# Patient Record
Sex: MALE | Race: WHITE | NOT HISPANIC OR LATINO | Employment: OTHER | URBAN - NONMETROPOLITAN AREA
[De-identification: names, ages, dates, MRNs, and addresses within clinical notes are randomized per-mention and may not be internally consistent; named-entity substitution may affect disease eponyms.]

---

## 2021-08-03 ENCOUNTER — OFFICE VISIT (OUTPATIENT)
Dept: FAMILY MEDICINE | Facility: CLINIC | Age: 56
End: 2021-08-03
Payer: OTHER GOVERNMENT

## 2021-08-03 VITALS
SYSTOLIC BLOOD PRESSURE: 128 MMHG | RESPIRATION RATE: 16 BRPM | WEIGHT: 205.19 LBS | DIASTOLIC BLOOD PRESSURE: 89 MMHG | TEMPERATURE: 97 F | BODY MASS INDEX: 32.2 KG/M2 | HEIGHT: 67 IN | HEART RATE: 72 BPM | OXYGEN SATURATION: 97 %

## 2021-08-03 DIAGNOSIS — R81 GLYCOSURIA: ICD-10-CM

## 2021-08-03 DIAGNOSIS — N40.1 BENIGN PROSTATIC HYPERPLASIA WITH URINARY HESITANCY: ICD-10-CM

## 2021-08-03 DIAGNOSIS — R39.11 HESITANCY: Primary | ICD-10-CM

## 2021-08-03 DIAGNOSIS — R39.11 BENIGN PROSTATIC HYPERPLASIA WITH URINARY HESITANCY: ICD-10-CM

## 2021-08-03 PROBLEM — G47.20 SLEEP PATTERN DISTURBANCE: Status: ACTIVE | Noted: 2021-01-12

## 2021-08-03 PROBLEM — R41.840 POOR CONCENTRATION: Status: ACTIVE | Noted: 2020-01-24

## 2021-08-03 PROBLEM — M62.838 MUSCLE SPASMS OF HEAD OR NECK: Status: ACTIVE | Noted: 2021-01-12

## 2021-08-03 PROBLEM — G44.86 CERVICOGENIC HEADACHE: Status: ACTIVE | Noted: 2021-01-12

## 2021-08-03 PROBLEM — M54.2 NECK PAIN: Status: ACTIVE | Noted: 2021-01-12

## 2021-08-03 PROBLEM — Z12.11 SCREENING FOR COLON CANCER: Status: ACTIVE | Noted: 2021-02-08

## 2021-08-03 PROBLEM — Z87.828 H/O HEAD INJURY: Status: ACTIVE | Noted: 2020-01-24

## 2021-08-03 PROBLEM — R41.3 MEMORY PROBLEM: Status: ACTIVE | Noted: 2020-01-24

## 2021-08-03 PROBLEM — G43.719 INTRACTABLE CHRONIC MIGRAINE WITHOUT AURA AND WITHOUT STATUS MIGRAINOSUS: Status: ACTIVE | Noted: 2020-01-24

## 2021-08-03 PROBLEM — E66.9 OBESITY: Status: ACTIVE | Noted: 2021-01-12

## 2021-08-03 PROBLEM — D12.6 BENIGN NEOPLASM OF COLON: Status: ACTIVE | Noted: 2021-03-19

## 2021-08-03 PROBLEM — M54.81 CERVICO-OCCIPITAL NEURALGIA: Status: ACTIVE | Noted: 2021-01-12

## 2021-08-03 LAB
ANION GAP SERPL CALCULATED.3IONS-SCNC: 11 MMOL/L (ref 7–16)
BILIRUB SERPL-MCNC: NEGATIVE MG/DL
BLOOD URINE, POC: NEGATIVE
BUN SERPL-MCNC: 15 MG/DL (ref 7–18)
BUN/CREAT SERPL: 12 (ref 6–20)
CALCIUM SERPL-MCNC: 9.2 MG/DL (ref 8.5–10.1)
CHLORIDE SERPL-SCNC: 99 MMOL/L (ref 98–107)
CO2 SERPL-SCNC: 30 MMOL/L (ref 21–32)
COLOR, POC UA: NORMAL
CREAT SERPL-MCNC: 1.21 MG/DL (ref 0.7–1.3)
GLUCOSE SERPL-MCNC: 381 MG/DL (ref 74–106)
GLUCOSE UR QL STRIP: NORMAL
KETONES UR QL STRIP: NEGATIVE
LEUKOCYTE ESTERASE URINE, POC: NEGATIVE
NITRITE, POC UA: NEGATIVE
PH, POC UA: 5
POTASSIUM SERPL-SCNC: 4.2 MMOL/L (ref 3.5–5.1)
PROTEIN, POC: NEGATIVE
SODIUM SERPL-SCNC: 136 MMOL/L (ref 136–145)
SPECIFIC GRAVITY, POC UA: 1.01
UROBILINOGEN, POC UA: 0.2

## 2021-08-03 PROCEDURE — 80048 BASIC METABOLIC PNL TOTAL CA: CPT | Mod: ,,, | Performed by: CLINICAL MEDICAL LABORATORY

## 2021-08-03 PROCEDURE — 84153 PSA, TOTAL AND FREE: ICD-10-PCS | Mod: 90,,, | Performed by: CLINICAL MEDICAL LABORATORY

## 2021-08-03 PROCEDURE — 87086 CULTURE, URINE: ICD-10-PCS | Mod: ,,, | Performed by: CLINICAL MEDICAL LABORATORY

## 2021-08-03 PROCEDURE — 80048 BASIC METABOLIC PANEL: ICD-10-PCS | Mod: ,,, | Performed by: CLINICAL MEDICAL LABORATORY

## 2021-08-03 PROCEDURE — 99213 OFFICE O/P EST LOW 20 MIN: CPT | Mod: ,,, | Performed by: FAMILY MEDICINE

## 2021-08-03 PROCEDURE — 81003 POCT URINALYSIS W/O SCOPE: ICD-10-PCS | Mod: QW,,, | Performed by: FAMILY MEDICINE

## 2021-08-03 PROCEDURE — 84154 ASSAY OF PSA FREE: CPT | Mod: 90,,, | Performed by: CLINICAL MEDICAL LABORATORY

## 2021-08-03 PROCEDURE — 81003 URINALYSIS AUTO W/O SCOPE: CPT | Mod: QW,,, | Performed by: FAMILY MEDICINE

## 2021-08-03 PROCEDURE — 84154 PSA, TOTAL AND FREE: ICD-10-PCS | Mod: 90,,, | Performed by: CLINICAL MEDICAL LABORATORY

## 2021-08-03 PROCEDURE — 99213 PR OFFICE/OUTPT VISIT, EST, LEVL III, 20-29 MIN: ICD-10-PCS | Mod: ,,, | Performed by: FAMILY MEDICINE

## 2021-08-03 PROCEDURE — 84153 ASSAY OF PSA TOTAL: CPT | Mod: 90,,, | Performed by: CLINICAL MEDICAL LABORATORY

## 2021-08-03 PROCEDURE — 87086 URINE CULTURE/COLONY COUNT: CPT | Mod: ,,, | Performed by: CLINICAL MEDICAL LABORATORY

## 2021-08-03 RX ORDER — AMITRIPTYLINE HYDROCHLORIDE 25 MG/1
25 TABLET, FILM COATED ORAL NIGHTLY
COMMUNITY
End: 2023-03-23

## 2021-08-03 RX ORDER — BACLOFEN 10 MG/1
TABLET ORAL
COMMUNITY
End: 2023-03-23

## 2021-08-03 RX ORDER — MELATONIN 3 MG
1 CAPSULE ORAL NIGHTLY
COMMUNITY

## 2021-08-03 RX ORDER — BUTALBITAL, ACETAMINOPHEN AND CAFFEINE 300; 40; 50 MG/1; MG/1; MG/1
CAPSULE ORAL
COMMUNITY
End: 2023-03-23

## 2021-08-03 RX ORDER — ZOLMITRIPTAN 5 MG/1
5 TABLET, FILM COATED ORAL
COMMUNITY

## 2021-08-03 RX ORDER — TAMSULOSIN HYDROCHLORIDE 0.4 MG/1
0.4 CAPSULE ORAL DAILY
Qty: 90 CAPSULE | Refills: 3 | Status: SHIPPED | OUTPATIENT
Start: 2021-08-03 | End: 2021-10-21 | Stop reason: SDUPTHER

## 2021-08-03 RX ORDER — CLONAZEPAM 1 MG/1
TABLET ORAL
COMMUNITY
End: 2022-09-07

## 2021-08-03 RX ORDER — AMITRIPTYLINE HYDROCHLORIDE 100 MG/1
TABLET ORAL
COMMUNITY

## 2021-08-03 RX ORDER — CYCLOBENZAPRINE HCL 10 MG
TABLET ORAL
COMMUNITY
End: 2023-03-23

## 2021-08-03 RX ORDER — INDOMETHACIN 75 MG/1
75 CAPSULE, EXTENDED RELEASE ORAL 2 TIMES DAILY
COMMUNITY
End: 2023-03-23

## 2021-08-03 RX ORDER — FREMANEZUMAB-VFRM 225 MG/1.5ML
1.5 INJECTION SUBCUTANEOUS
COMMUNITY
End: 2023-03-23

## 2021-08-03 RX ORDER — PROPRANOLOL HYDROCHLORIDE 80 MG/1
80 CAPSULE, EXTENDED RELEASE ORAL DAILY
COMMUNITY
End: 2023-03-23

## 2021-08-03 RX ORDER — ERGOCALCIFEROL 1.25 MG/1
CAPSULE ORAL
COMMUNITY
End: 2023-03-23

## 2021-08-04 DIAGNOSIS — Z79.4 TYPE 2 DIABETES MELLITUS WITH HYPERGLYCEMIA, WITH LONG-TERM CURRENT USE OF INSULIN: Primary | ICD-10-CM

## 2021-08-04 DIAGNOSIS — E11.65 TYPE 2 DIABETES MELLITUS WITH HYPERGLYCEMIA, WITH LONG-TERM CURRENT USE OF INSULIN: Primary | ICD-10-CM

## 2021-08-04 PROCEDURE — 83036 HEMOGLOBIN GLYCOSYLATED A1C: CPT | Mod: ,,, | Performed by: CLINICAL MEDICAL LABORATORY

## 2021-08-04 PROCEDURE — 36415 COLL VENOUS BLD VENIPUNCTURE: CPT | Mod: ,,, | Performed by: CLINICAL MEDICAL LABORATORY

## 2021-08-04 PROCEDURE — 36415 PR COLLECTION VENOUS BLOOD,VENIPUNCTURE: ICD-10-PCS | Mod: ,,, | Performed by: CLINICAL MEDICAL LABORATORY

## 2021-08-04 PROCEDURE — 83036 HEMOGLOBIN A1C: ICD-10-PCS | Mod: ,,, | Performed by: CLINICAL MEDICAL LABORATORY

## 2021-08-04 RX ORDER — METFORMIN HYDROCHLORIDE 1000 MG/1
1000 TABLET ORAL 2 TIMES DAILY WITH MEALS
Qty: 180 TABLET | Refills: 3 | Status: SHIPPED | OUTPATIENT
Start: 2021-08-04 | End: 2021-10-21 | Stop reason: SDUPTHER

## 2021-08-05 LAB
HBA1C MFR BLD: 16.4 % (ref 4–5.6)
PSA FREE MFR SERPL: NORMAL RATIO
PSA FREE SERPL IA-MCNC: 0.2 NG/ML
PSA SERPL IA-MCNC: 0.74 NG/ML
UA COMPLETE W REFLEX CULTURE PNL UR: NO GROWTH

## 2021-10-21 DIAGNOSIS — N40.1 BENIGN PROSTATIC HYPERPLASIA WITH URINARY HESITANCY: ICD-10-CM

## 2021-10-21 DIAGNOSIS — R39.11 BENIGN PROSTATIC HYPERPLASIA WITH URINARY HESITANCY: ICD-10-CM

## 2021-10-21 DIAGNOSIS — Z79.4 TYPE 2 DIABETES MELLITUS WITH HYPERGLYCEMIA, WITH LONG-TERM CURRENT USE OF INSULIN: ICD-10-CM

## 2021-10-21 DIAGNOSIS — E11.65 TYPE 2 DIABETES MELLITUS WITH HYPERGLYCEMIA, WITH LONG-TERM CURRENT USE OF INSULIN: ICD-10-CM

## 2021-10-21 RX ORDER — METFORMIN HYDROCHLORIDE 1000 MG/1
1000 TABLET ORAL 2 TIMES DAILY WITH MEALS
Qty: 180 TABLET | Refills: 3 | Status: SHIPPED | OUTPATIENT
Start: 2021-10-21 | End: 2022-09-07 | Stop reason: SDUPTHER

## 2021-10-21 RX ORDER — TAMSULOSIN HYDROCHLORIDE 0.4 MG/1
0.4 CAPSULE ORAL DAILY
Qty: 90 CAPSULE | Refills: 3 | Status: SHIPPED | OUTPATIENT
Start: 2021-10-21 | End: 2022-09-07 | Stop reason: SDUPTHER

## 2021-11-15 ENCOUNTER — OFFICE VISIT (OUTPATIENT)
Dept: FAMILY MEDICINE | Facility: CLINIC | Age: 56
End: 2021-11-15
Payer: OTHER GOVERNMENT

## 2021-11-15 VITALS
DIASTOLIC BLOOD PRESSURE: 98 MMHG | HEIGHT: 67 IN | OXYGEN SATURATION: 97 % | HEART RATE: 98 BPM | TEMPERATURE: 98 F | BODY MASS INDEX: 34.79 KG/M2 | SYSTOLIC BLOOD PRESSURE: 118 MMHG | WEIGHT: 221.63 LBS

## 2021-11-15 DIAGNOSIS — Z79.4 TYPE 2 DIABETES MELLITUS WITH HYPERGLYCEMIA, WITH LONG-TERM CURRENT USE OF INSULIN: Primary | ICD-10-CM

## 2021-11-15 DIAGNOSIS — E11.65 TYPE 2 DIABETES MELLITUS WITH HYPERGLYCEMIA, WITH LONG-TERM CURRENT USE OF INSULIN: Primary | ICD-10-CM

## 2021-11-15 PROBLEM — M25.529 ARTHRALGIA OF ELBOW: Status: ACTIVE | Noted: 2021-11-15

## 2021-11-15 PROBLEM — M54.50 LOW BACK PAIN: Status: ACTIVE | Noted: 2021-11-15

## 2021-11-15 PROBLEM — R51.9 FACIAL PAIN: Status: ACTIVE | Noted: 2021-01-12

## 2021-11-15 PROBLEM — S62.639B OPEN FRACTURE OF DISTAL PHALANX OR PHALANGES OF HAND: Status: ACTIVE | Noted: 2021-11-15

## 2021-11-15 LAB
ALBUMIN SERPL BCP-MCNC: 3.9 G/DL (ref 3.5–5)
ALBUMIN/GLOB SERPL: 1.1 {RATIO}
ALP SERPL-CCNC: 74 U/L (ref 45–115)
ALT SERPL W P-5'-P-CCNC: 51 U/L (ref 16–61)
ANION GAP SERPL CALCULATED.3IONS-SCNC: 9 MMOL/L (ref 7–16)
AST SERPL W P-5'-P-CCNC: 24 U/L (ref 15–37)
BILIRUB SERPL-MCNC: 0.3 MG/DL (ref 0–1.2)
BUN SERPL-MCNC: 12 MG/DL (ref 7–18)
BUN/CREAT SERPL: 11 (ref 6–20)
CALCIUM SERPL-MCNC: 9.3 MG/DL (ref 8.5–10.1)
CHLORIDE SERPL-SCNC: 104 MMOL/L (ref 98–107)
CHOLEST SERPL-MCNC: 189 MG/DL (ref 0–200)
CHOLEST/HDLC SERPL: 2.7 {RATIO}
CO2 SERPL-SCNC: 31 MMOL/L (ref 21–32)
CREAT SERPL-MCNC: 1.08 MG/DL (ref 0.7–1.3)
CREAT UR-MCNC: 100 MG/DL (ref 39–259)
EST. AVERAGE GLUCOSE BLD GHB EST-MCNC: 190 MG/DL
GLOBULIN SER-MCNC: 3.4 G/DL (ref 2–4)
GLUCOSE SERPL-MCNC: 100 MG/DL (ref 70–110)
GLUCOSE SERPL-MCNC: 103 MG/DL (ref 74–106)
HBA1C MFR BLD HPLC: 8.3 % (ref 4.5–6.6)
HDLC SERPL-MCNC: 69 MG/DL (ref 40–60)
LDLC SERPL CALC-MCNC: 103 MG/DL
LDLC/HDLC SERPL: 1.5 {RATIO}
MICROALBUMIN UR-MCNC: <0.5 MG/DL (ref 0–2.8)
MICROALBUMIN/CREAT RATIO PNL UR: <5 MG/G (ref 0–30)
NONHDLC SERPL-MCNC: 120 MG/DL
POTASSIUM SERPL-SCNC: 4.4 MMOL/L (ref 3.5–5.1)
PROT SERPL-MCNC: 7.3 G/DL (ref 6.4–8.2)
SODIUM SERPL-SCNC: 140 MMOL/L (ref 136–145)
TRIGL SERPL-MCNC: 83 MG/DL (ref 35–150)
VLDLC SERPL-MCNC: 17 MG/DL

## 2021-11-15 PROCEDURE — 80053 COMPREHENSIVE METABOLIC PANEL: ICD-10-PCS | Mod: ,,, | Performed by: CLINICAL MEDICAL LABORATORY

## 2021-11-15 PROCEDURE — 82043 UR ALBUMIN QUANTITATIVE: CPT | Mod: ,,, | Performed by: CLINICAL MEDICAL LABORATORY

## 2021-11-15 PROCEDURE — 82043 MICROALBUMIN / CREATININE RATIO URINE: ICD-10-PCS | Mod: ,,, | Performed by: CLINICAL MEDICAL LABORATORY

## 2021-11-15 PROCEDURE — 83036 HEMOGLOBIN A1C: ICD-10-PCS | Mod: ,,, | Performed by: CLINICAL MEDICAL LABORATORY

## 2021-11-15 PROCEDURE — 99213 OFFICE O/P EST LOW 20 MIN: CPT | Mod: ,,, | Performed by: FAMILY MEDICINE

## 2021-11-15 PROCEDURE — 82570 ASSAY OF URINE CREATININE: CPT | Mod: ,,, | Performed by: CLINICAL MEDICAL LABORATORY

## 2021-11-15 PROCEDURE — 80053 COMPREHEN METABOLIC PANEL: CPT | Mod: ,,, | Performed by: CLINICAL MEDICAL LABORATORY

## 2021-11-15 PROCEDURE — 99213 PR OFFICE/OUTPT VISIT, EST, LEVL III, 20-29 MIN: ICD-10-PCS | Mod: ,,, | Performed by: FAMILY MEDICINE

## 2021-11-15 PROCEDURE — 80061 LIPID PANEL: CPT | Mod: ,,, | Performed by: CLINICAL MEDICAL LABORATORY

## 2021-11-15 PROCEDURE — 83036 HEMOGLOBIN GLYCOSYLATED A1C: CPT | Mod: ,,, | Performed by: CLINICAL MEDICAL LABORATORY

## 2021-11-15 PROCEDURE — 82570 MICROALBUMIN / CREATININE RATIO URINE: ICD-10-PCS | Mod: ,,, | Performed by: CLINICAL MEDICAL LABORATORY

## 2021-11-15 PROCEDURE — 80061 LIPID PANEL: ICD-10-PCS | Mod: ,,, | Performed by: CLINICAL MEDICAL LABORATORY

## 2021-11-16 RX ORDER — ORAL SEMAGLUTIDE 3 MG/1
3 TABLET ORAL DAILY
Qty: 90 TABLET | Refills: 1 | Status: SHIPPED | OUTPATIENT
Start: 2021-11-16 | End: 2021-11-19 | Stop reason: SDUPTHER

## 2021-11-22 RX ORDER — ORAL SEMAGLUTIDE 3 MG/1
3 TABLET ORAL DAILY
Qty: 90 TABLET | Refills: 1 | Status: SHIPPED | OUTPATIENT
Start: 2021-11-22 | End: 2022-05-21

## 2022-06-17 LAB — HBA1C MFR BLD: 7.5 %

## 2022-09-07 ENCOUNTER — OFFICE VISIT (OUTPATIENT)
Dept: FAMILY MEDICINE | Facility: CLINIC | Age: 57
End: 2022-09-07
Payer: OTHER GOVERNMENT

## 2022-09-07 VITALS
RESPIRATION RATE: 16 BRPM | DIASTOLIC BLOOD PRESSURE: 84 MMHG | HEART RATE: 88 BPM | HEIGHT: 67 IN | SYSTOLIC BLOOD PRESSURE: 168 MMHG | OXYGEN SATURATION: 96 % | BODY MASS INDEX: 34.69 KG/M2 | WEIGHT: 221 LBS

## 2022-09-07 DIAGNOSIS — M54.81 CERVICO-OCCIPITAL NEURALGIA: Primary | ICD-10-CM

## 2022-09-07 DIAGNOSIS — Z79.4 TYPE 2 DIABETES MELLITUS WITH HYPERGLYCEMIA, WITH LONG-TERM CURRENT USE OF INSULIN: ICD-10-CM

## 2022-09-07 DIAGNOSIS — R39.11 BENIGN PROSTATIC HYPERPLASIA WITH URINARY HESITANCY: ICD-10-CM

## 2022-09-07 DIAGNOSIS — E11.65 TYPE 2 DIABETES MELLITUS WITH HYPERGLYCEMIA, WITH LONG-TERM CURRENT USE OF INSULIN: ICD-10-CM

## 2022-09-07 DIAGNOSIS — N40.1 BENIGN PROSTATIC HYPERPLASIA WITH URINARY HESITANCY: ICD-10-CM

## 2022-09-07 PROCEDURE — 99214 OFFICE O/P EST MOD 30 MIN: CPT | Mod: 25,,, | Performed by: FAMILY MEDICINE

## 2022-09-07 PROCEDURE — 20552 NJX 1/MLT TRIGGER POINT 1/2: CPT | Mod: ,,, | Performed by: FAMILY MEDICINE

## 2022-09-07 PROCEDURE — 20552 PR INJECT TRIGGER POINT, 1 OR 2: ICD-10-PCS | Mod: ,,, | Performed by: FAMILY MEDICINE

## 2022-09-07 PROCEDURE — 99214 PR OFFICE/OUTPT VISIT, EST, LEVL IV, 30-39 MIN: ICD-10-PCS | Mod: 25,,, | Performed by: FAMILY MEDICINE

## 2022-09-07 RX ORDER — TAMSULOSIN HYDROCHLORIDE 0.4 MG/1
0.4 CAPSULE ORAL DAILY
Qty: 90 CAPSULE | Refills: 1 | Status: SHIPPED | OUTPATIENT
Start: 2022-09-07 | End: 2023-03-23 | Stop reason: SDUPTHER

## 2022-09-07 RX ORDER — CLONAZEPAM 0.5 MG/1
1 TABLET ORAL 2 TIMES DAILY
COMMUNITY
Start: 2022-08-23

## 2022-09-07 RX ORDER — METFORMIN HYDROCHLORIDE 1000 MG/1
500 TABLET ORAL 2 TIMES DAILY WITH MEALS
Qty: 180 TABLET | Refills: 1 | Status: SHIPPED | OUTPATIENT
Start: 2022-09-07 | End: 2023-03-23 | Stop reason: SDUPTHER

## 2022-09-07 RX ORDER — METFORMIN HYDROCHLORIDE 1000 MG/1
1000 TABLET ORAL 2 TIMES DAILY WITH MEALS
Qty: 180 TABLET | Refills: 1 | Status: SHIPPED | OUTPATIENT
Start: 2022-09-07 | End: 2022-09-07

## 2022-09-07 RX ORDER — AMOXICILLIN 250 MG
CAPSULE ORAL
COMMUNITY
Start: 2022-02-20 | End: 2022-09-29

## 2022-09-07 NOTE — PROGRESS NOTES
Sravanthi Silvestre MD        PATIENT NAME: Eliazar Bermudez  : 1965  DATE: 22  MRN: [id number]      Billing Provider: Sravanthi Silvestre MD  Level of Service:   Patient PCP Information       Provider PCP Type    Sravanthi Silvestre MD General            Reason for Visit / Chief Complaint: Diabetes (3 mo f/u) and Headache (Chronic migraine and states he has one now.. He is seeing neurology but his doctor moved. )       History of Present Illness      Eliazar Bermudez presents to the clinic with Diabetes (3 mo f/u) and Headache (Chronic migraine and states he has one now.. He is seeing neurology but his doctor moved. )     Diabetes  He presents for his follow-up diabetic visit. He has type 2 diabetes mellitus. Hypoglycemia symptoms include confusion and headaches. Pertinent negatives for diabetes include no chest pain, no fatigue, no polydipsia, no polyuria and no weakness.   Headache   This is a recurrent problem. The current episode started more than 1 month ago. The problem occurs daily. The problem has been unchanged. The pain is located in the Bilateral and occipital region. The pain is at a severity of 8/10. Associated symptoms include neck pain. Pertinent negatives include no facial sweating, fever, hearing loss, loss of balance, rhinorrhea, vomiting or weakness.     Review of Systems     Review of Systems   Constitutional:  Positive for activity change and unexpected weight change. Negative for appetite change, fatigue and fever.   HENT:  Positive for trouble swallowing. Negative for hearing loss and rhinorrhea.    Eyes:  Positive for visual disturbance. Negative for discharge.   Respiratory:  Negative for chest tightness, shortness of breath and wheezing.    Cardiovascular:  Negative for chest pain and palpitations.   Gastrointestinal:  Positive for constipation. Negative for blood in stool, diarrhea and vomiting.   Endocrine: Negative for polydipsia and polyuria.   Genitourinary:  Negative for difficulty urinating,  hematuria and urgency.   Musculoskeletal:  Positive for arthralgias, joint swelling and neck pain.   Allergic/Immunologic: Positive for environmental allergies.   Neurological:  Positive for headaches. Negative for weakness and loss of balance.   Psychiatric/Behavioral:  Positive for confusion and dysphoric mood. Negative for agitation, behavioral problems and suicidal ideas.      Medical / Social / Family History     Past Medical History:   Diagnosis Date    Chronic migraine without aura     History of closed head injury 08/08/2018    Joint pain     Knee pain, right     Migraines        Past Surgical History:   Procedure Laterality Date    KNEE ARTHROSCOPY      x 3 right    KNEE SURGERY      x 4 left    TOTAL KNEE ARTHROPLASTY      left    WISDOM TOOTH EXTRACTION         Social History    reports that he has never smoked. He has never used smokeless tobacco. He reports current alcohol use. He reports that he does not use drugs.    Family History  's family history includes Arthritis in his maternal grandmother; COPD in his brother and mother; Diabetes in his maternal grandmother; Heart attack in his maternal grandfather.    Medications and Allergies     Medications  Outpatient Medications Marked as Taking for the 9/7/22 encounter (Office Visit) with Sravanthi Silvestre MD   Medication Sig Dispense Refill    amitriptyline (ELAVIL) 100 MG tablet amitriptyline 100 mg tablet      clonazePAM (KLONOPIN) 0.5 MG tablet Take 1 mg by mouth 2 (two) times daily.      melatonin 3 mg Cap melatonin   3 mg po q HS      senna-docusate 8.6-50 mg (PERICOLACE) 8.6-50 mg per tablet TAKE 1 TABLET BY MOUTH EVERY DAY AS NEEDED FOR CONSTIPATION      ZOLMitriptan (ZOMIG) 5 MG tablet Take 5 mg by mouth as needed for Migraine.      [DISCONTINUED] metFORMIN (GLUCOPHAGE) 1000 MG tablet Take 1 tablet (1,000 mg total) by mouth 2 (two) times daily with meals. 180 tablet 3    [DISCONTINUED] tamsulosin (FLOMAX) 0.4 mg Cap Take 1 capsule (0.4 mg  "total) by mouth once daily. 90 capsule 3       Allergies  Review of patient's allergies indicates:  No Known Allergies    Physical Examination   BP (!) 168/84 (BP Location: Right arm)   Pulse 88   Resp 16   Ht 5' 7" (1.702 m)   Wt 100.2 kg (221 lb)   SpO2 96%   BMI 34.61 kg/m²     Physical Exam  Constitutional:       Appearance: Normal appearance. He is obese.   Neck:        Comments: Triggers of high intensity pain as per above is where trigger joint injections were done today  Cardiovascular:      Rate and Rhythm: Normal rate and regular rhythm.   Pulmonary:      Effort: Pulmonary effort is normal.      Breath sounds: Normal breath sounds.   Musculoskeletal:      Cervical back: Pain with movement present.   Neurological:      Mental Status: He is alert.   Psychiatric:         Mood and Affect: Mood normal.         Behavior: Behavior normal.     The areas above were prepped with alcohol, using a 25 G needled I introduced about 0.5 a cc in each space of lidocaine with out epinephrine. The pt tolerated this well. Total points injected 8.    Assessment and Plan (including Health Maintenance)     Plan:         Problem List Items Addressed This Visit          Orthopedic    Cervico-occipital neuralgia - Primary     Other Visit Diagnoses       Benign prostatic hyperplasia with urinary hesitancy        Relevant Medications    tamsulosin (FLOMAX) 0.4 mg Cap    Type 2 diabetes mellitus with hyperglycemia, with long-term current use of insulin        Relevant Medications    metFORMIN (GLUCOPHAGE) 1000 MG tablet              Follow up in about 6 months (around 3/7/2023).        Signature:  Sravanthi Silvestre MD      Date of encounter: 9/7/22      "

## 2023-03-23 ENCOUNTER — OFFICE VISIT (OUTPATIENT)
Dept: FAMILY MEDICINE | Facility: CLINIC | Age: 58
End: 2023-03-23
Payer: OTHER GOVERNMENT

## 2023-03-23 VITALS
SYSTOLIC BLOOD PRESSURE: 140 MMHG | TEMPERATURE: 98 F | DIASTOLIC BLOOD PRESSURE: 88 MMHG | HEART RATE: 84 BPM | HEIGHT: 67 IN | OXYGEN SATURATION: 97 % | WEIGHT: 218 LBS | BODY MASS INDEX: 34.21 KG/M2 | RESPIRATION RATE: 18 BRPM

## 2023-03-23 DIAGNOSIS — Z12.5 SCREENING FOR MALIGNANT NEOPLASM OF PROSTATE: ICD-10-CM

## 2023-03-23 DIAGNOSIS — N40.1 BENIGN PROSTATIC HYPERPLASIA WITH URINARY HESITANCY: ICD-10-CM

## 2023-03-23 DIAGNOSIS — M25.561 CHRONIC PAIN OF RIGHT KNEE: ICD-10-CM

## 2023-03-23 DIAGNOSIS — Z79.4 TYPE 2 DIABETES MELLITUS WITH HYPERGLYCEMIA, WITH LONG-TERM CURRENT USE OF INSULIN: Primary | ICD-10-CM

## 2023-03-23 DIAGNOSIS — R39.11 BENIGN PROSTATIC HYPERPLASIA WITH URINARY HESITANCY: ICD-10-CM

## 2023-03-23 DIAGNOSIS — G89.29 CHRONIC PAIN OF RIGHT KNEE: ICD-10-CM

## 2023-03-23 DIAGNOSIS — E78.5 HYPERLIPIDEMIA, UNSPECIFIED HYPERLIPIDEMIA TYPE: ICD-10-CM

## 2023-03-23 DIAGNOSIS — E11.65 TYPE 2 DIABETES MELLITUS WITH HYPERGLYCEMIA, WITH LONG-TERM CURRENT USE OF INSULIN: Primary | ICD-10-CM

## 2023-03-23 DIAGNOSIS — I10 HYPERTENSION, UNSPECIFIED TYPE: ICD-10-CM

## 2023-03-23 LAB
ALBUMIN SERPL BCP-MCNC: 3.8 G/DL (ref 3.5–5)
ALBUMIN/GLOB SERPL: 1.2 {RATIO}
ALP SERPL-CCNC: 80 U/L (ref 45–115)
ALT SERPL W P-5'-P-CCNC: 94 U/L (ref 16–61)
ANION GAP SERPL CALCULATED.3IONS-SCNC: 9 MMOL/L (ref 7–16)
AST SERPL W P-5'-P-CCNC: 36 U/L (ref 15–37)
BASOPHILS # BLD AUTO: 0.04 K/UL (ref 0–0.2)
BASOPHILS NFR BLD AUTO: 0.7 % (ref 0–1)
BILIRUB SERPL-MCNC: 0.3 MG/DL (ref ?–1.2)
BUN SERPL-MCNC: 12 MG/DL (ref 7–18)
BUN/CREAT SERPL: 10 (ref 6–20)
CALCIUM SERPL-MCNC: 9 MG/DL (ref 8.5–10.1)
CHLORIDE SERPL-SCNC: 100 MMOL/L (ref 98–107)
CO2 SERPL-SCNC: 29 MMOL/L (ref 21–32)
CREAT SERPL-MCNC: 1.24 MG/DL (ref 0.7–1.3)
DIFFERENTIAL METHOD BLD: ABNORMAL
EGFR (NO RACE VARIABLE) (RUSH/TITUS): 67 ML/MIN/1.73M²
EOSINOPHIL # BLD AUTO: 0.2 K/UL (ref 0–0.5)
EOSINOPHIL NFR BLD AUTO: 3.4 % (ref 1–4)
ERYTHROCYTE [DISTWIDTH] IN BLOOD BY AUTOMATED COUNT: 12.1 % (ref 11.5–14.5)
EST. AVERAGE GLUCOSE BLD GHB EST-MCNC: 250 MG/DL
GLOBULIN SER-MCNC: 3.3 G/DL (ref 2–4)
GLUCOSE SERPL-MCNC: 235 MG/DL (ref 74–106)
HBA1C MFR BLD HPLC: 10.1 % (ref 4.5–6.6)
HCT VFR BLD AUTO: 40.3 % (ref 40–54)
HGB BLD-MCNC: 12.9 G/DL (ref 13.5–18)
IMM GRANULOCYTES # BLD AUTO: 0.03 K/UL (ref 0–0.04)
IMM GRANULOCYTES NFR BLD: 0.5 % (ref 0–0.4)
LDLC SERPL DIRECT ASSAY-MCNC: 121 MG/DL
LYMPHOCYTES # BLD AUTO: 2.34 K/UL (ref 1–4.8)
LYMPHOCYTES NFR BLD AUTO: 39.9 % (ref 27–41)
MCH RBC QN AUTO: 28.4 PG (ref 27–31)
MCHC RBC AUTO-ENTMCNC: 32 G/DL (ref 32–36)
MCV RBC AUTO: 88.8 FL (ref 80–96)
MONOCYTES # BLD AUTO: 0.56 K/UL (ref 0–0.8)
MONOCYTES NFR BLD AUTO: 9.6 % (ref 2–6)
MPC BLD CALC-MCNC: 11 FL (ref 9.4–12.4)
NEUTROPHILS # BLD AUTO: 2.69 K/UL (ref 1.8–7.7)
NEUTROPHILS NFR BLD AUTO: 45.9 % (ref 53–65)
NRBC # BLD AUTO: 0 X10E3/UL
NRBC, AUTO (.00): 0 %
PLATELET # BLD AUTO: 194 K/UL (ref 150–400)
POTASSIUM SERPL-SCNC: 3.9 MMOL/L (ref 3.5–5.1)
PROT SERPL-MCNC: 7.1 G/DL (ref 6.4–8.2)
PSA SERPL-MCNC: 1.25 NG/ML
RBC # BLD AUTO: 4.54 M/UL (ref 4.6–6.2)
SODIUM SERPL-SCNC: 134 MMOL/L (ref 136–145)
WBC # BLD AUTO: 5.86 K/UL (ref 4.5–11)

## 2023-03-23 PROCEDURE — G0103 PSA SCREENING: HCPCS | Mod: ,,, | Performed by: CLINICAL MEDICAL LABORATORY

## 2023-03-23 PROCEDURE — 83036 HEMOGLOBIN A1C: ICD-10-PCS | Mod: ,,, | Performed by: CLINICAL MEDICAL LABORATORY

## 2023-03-23 PROCEDURE — 80053 COMPREHENSIVE METABOLIC PANEL: ICD-10-PCS | Mod: ,,, | Performed by: CLINICAL MEDICAL LABORATORY

## 2023-03-23 PROCEDURE — 83721 LDL CHOLESTEROL, DIRECT: ICD-10-PCS | Mod: ,,, | Performed by: CLINICAL MEDICAL LABORATORY

## 2023-03-23 PROCEDURE — 85025 CBC WITH DIFFERENTIAL: ICD-10-PCS | Mod: ,,, | Performed by: CLINICAL MEDICAL LABORATORY

## 2023-03-23 PROCEDURE — 85025 COMPLETE CBC W/AUTO DIFF WBC: CPT | Mod: ,,, | Performed by: CLINICAL MEDICAL LABORATORY

## 2023-03-23 PROCEDURE — G0103 PSA, SCREENING: ICD-10-PCS | Mod: ,,, | Performed by: CLINICAL MEDICAL LABORATORY

## 2023-03-23 PROCEDURE — 83036 HEMOGLOBIN GLYCOSYLATED A1C: CPT | Mod: ,,, | Performed by: CLINICAL MEDICAL LABORATORY

## 2023-03-23 PROCEDURE — 99214 PR OFFICE/OUTPT VISIT, EST, LEVL IV, 30-39 MIN: ICD-10-PCS | Mod: ,,, | Performed by: NURSE PRACTITIONER

## 2023-03-23 PROCEDURE — 99214 OFFICE O/P EST MOD 30 MIN: CPT | Mod: ,,, | Performed by: NURSE PRACTITIONER

## 2023-03-23 PROCEDURE — 80053 COMPREHEN METABOLIC PANEL: CPT | Mod: ,,, | Performed by: CLINICAL MEDICAL LABORATORY

## 2023-03-23 PROCEDURE — 83721 ASSAY OF BLOOD LIPOPROTEIN: CPT | Mod: ,,, | Performed by: CLINICAL MEDICAL LABORATORY

## 2023-03-23 RX ORDER — CITALOPRAM 40 MG/1
1 TABLET, FILM COATED ORAL DAILY
COMMUNITY
End: 2023-03-23

## 2023-03-23 RX ORDER — PRAVASTATIN SODIUM 20 MG/1
1 TABLET ORAL DAILY
COMMUNITY
End: 2023-03-23

## 2023-03-23 RX ORDER — CELECOXIB 200 MG/1
1 CAPSULE ORAL DAILY
COMMUNITY
End: 2023-03-23

## 2023-03-23 RX ORDER — LISINOPRIL 10 MG/1
1 TABLET ORAL DAILY
COMMUNITY
End: 2023-03-23

## 2023-03-23 RX ORDER — OMEGA-3S/DHA/EPA/FISH OIL/D3 300MG-1000
1 CAPSULE ORAL DAILY
COMMUNITY
Start: 2022-12-10

## 2023-03-23 RX ORDER — TOPIRAMATE 50 MG/1
50 TABLET, FILM COATED ORAL DAILY
COMMUNITY
Start: 2023-01-31 | End: 2023-09-06

## 2023-03-23 RX ORDER — TAMSULOSIN HYDROCHLORIDE 0.4 MG/1
0.4 CAPSULE ORAL DAILY
Qty: 90 CAPSULE | Refills: 1 | Status: SHIPPED | OUTPATIENT
Start: 2023-03-23 | End: 2023-09-14 | Stop reason: SDUPTHER

## 2023-03-23 RX ORDER — SILDENAFIL 100 MG/1
1 TABLET, FILM COATED ORAL
COMMUNITY
Start: 2022-12-06 | End: 2023-03-23

## 2023-03-23 RX ORDER — NORTRIPTYLINE HYDROCHLORIDE 25 MG/1
3 CAPSULE ORAL NIGHTLY
COMMUNITY
End: 2023-03-23

## 2023-03-23 RX ORDER — METFORMIN HYDROCHLORIDE 1000 MG/1
500 TABLET ORAL 2 TIMES DAILY WITH MEALS
Qty: 180 TABLET | Refills: 1 | Status: SHIPPED | OUTPATIENT
Start: 2023-03-23 | End: 2023-09-06 | Stop reason: SDUPTHER

## 2023-03-23 RX ORDER — DIVALPROEX SODIUM 250 MG/1
1 TABLET, DELAYED RELEASE ORAL 2 TIMES DAILY
COMMUNITY
End: 2023-03-23

## 2023-03-23 NOTE — ASSESSMENT & PLAN NOTE
Not to goal, - work on diet, specifically cutting back bread, breaded things, cereal, pasta, potatoes, rice  - try to exercise at least 150min a week divided however you want  - if you can do weight, even very light weight do them  - try not to use to much salt, if any, remember that things that are preserved or frozen often contain large amounts of salt as a preserve

## 2023-03-23 NOTE — ASSESSMENT & PLAN NOTE
Lab today - work on diet, specifically cutting back bread, breaded things, cereal, pasta, potatoes, rice  - try to exercise at least 150min a week divided however you want  - if you can do weight, even very light weight do them  - keep an eye on sugars, fasting sugar goal , after eating no greater than 180  - A1C goal is around 7.0%  - continue current regiment and please note if any changes were made

## 2023-03-23 NOTE — PROGRESS NOTES
LAWRENCE Parks   [facility]  [address]  [place] [address]     PATIENT NAME: Eliazar Bermudez  : 1965  DATE: 3/23/23  MRN: [id number]      Billing Provider: LAWRENCE Parks  Level of Service:   Patient PCP Information       Provider PCP Type    LAWRENCE Parks General            Reason for Visit / Chief Complaint: Establish Care, Diabetes, and Benign Prostatic Hypertrophy       Update PCP  Update Chief Complaint         History of Present Illness / Problem Focused Workflow     Eliazar Bermudez presents to the clinic establish care for diabetes on metformin and bph on flomax he reports medications are working well.     Hemoglobin A1C   Date Value Ref Range Status   2022 7.5 (H) % Final   11/15/2021 8.3 (H) 4.5 - 6.6 % Final     Comment:       Normal:               <5.7%  Pre-Diabetic:       5.7% to 6.4%  Diabetic:             >6.4%  Diabetic Goal:     <7%        CMP  Sodium   Date Value Ref Range Status   11/15/2021 140 136 - 145 mmol/L Final     Potassium   Date Value Ref Range Status   11/15/2021 4.4 3.5 - 5.1 mmol/L Final     Chloride   Date Value Ref Range Status   11/15/2021 104 98 - 107 mmol/L Final     CO2   Date Value Ref Range Status   11/15/2021 31 21 - 32 mmol/L Final     Glucose   Date Value Ref Range Status   11/15/2021 103 74 - 106 mg/dL Final     BUN   Date Value Ref Range Status   11/15/2021 12 7 - 18 mg/dL Final     Creatinine   Date Value Ref Range Status   11/15/2021 1.08 0.70 - 1.30 mg/dL Final     Calcium   Date Value Ref Range Status   11/15/2021 9.3 8.5 - 10.1 mg/dL Final     Total Protein   Date Value Ref Range Status   11/15/2021 7.3 6.4 - 8.2 g/dL Final     Albumin   Date Value Ref Range Status   11/15/2021 3.9 3.5 - 5.0 g/dL Final     Bilirubin, Total   Date Value Ref Range Status   11/15/2021 0.3 0.0 - 1.2 mg/dL Final     Alk Phos   Date Value Ref Range Status   11/15/2021 74 45 - 115 U/L Final     AST   Date Value Ref Range  Status   11/15/2021 24 15 - 37 U/L Final     ALT   Date Value Ref Range Status   11/15/2021 51 16 - 61 U/L Final     Anion Gap   Date Value Ref Range Status   11/15/2021 9 7 - 16 mmol/L Final        No results found for: WBC, RBC, HGB, HCT, MCV, MCH, MCHC, RDW, PLT, MPV, GRAN, LYMPH, MONO, EOS, BASO, EOSINOPHIL, BASOPHIL     Lab Results   Component Value Date    CHOL 189 11/15/2021     Lab Results   Component Value Date    HDL 69 (H) 11/15/2021     Lab Results   Component Value Date    LDLCALC 103 11/15/2021     Lab Results   Component Value Date    TRIG 83 11/15/2021     Lab Results   Component Value Date    CHOLHDL 2.7 11/15/2021        Wt Readings from Last 3 Encounters:   03/23/23 0855 98.9 kg (218 lb)   09/07/22 1558 100.2 kg (221 lb)   11/15/21 1011 100.5 kg (221 lb 9.6 oz)        BP Readings from Last 3 Encounters:   03/23/23 (!) 140/88   09/07/22 (!) 168/84   11/15/21 (!) 118/98        Review of Systems     Review of Systems   Constitutional:  Negative for appetite change and fatigue.   HENT:  Negative for ear pain, mouth sores and trouble swallowing.    Respiratory:  Negative for cough and shortness of breath.    Cardiovascular:  Negative for chest pain.   Gastrointestinal:  Negative for abdominal pain.   Musculoskeletal:  Positive for arthralgias and leg pain.        Right knee pain      Medical / Social / Family History     Past Medical History:   Diagnosis Date    Chronic migraine without aura     History of closed head injury 08/08/2018    Joint pain     Knee pain, right     Migraines        Past Surgical History:   Procedure Laterality Date    FRACTURE SURGERY  05/25/2009    Knee scope    JOINT REPLACEMENT  09/22/2012    KNEE ARTHROSCOPY      x 3 right    KNEE SURGERY      x 4 left    TOTAL KNEE ARTHROPLASTY      left    WISDOM TOOTH EXTRACTION         Social History    reports that he has never smoked. He has never been exposed to tobacco smoke. He has never used smokeless tobacco. He reports  "current alcohol use. He reports that he does not use drugs.    Family History  's family history includes Arthritis in his maternal grandmother and mother; COPD in his brother and mother; Diabetes in his maternal grandmother; Heart attack in his maternal grandfather.    Medications and Allergies     Medications  Outpatient Medications Marked as Taking for the 3/23/23 encounter (Office Visit) with LAWRENCE Parks   Medication Sig Dispense Refill    amitriptyline (ELAVIL) 100 MG tablet amitriptyline 100 mg tablet      clonazePAM (KLONOPIN) 0.5 MG tablet Take 1 mg by mouth 2 (two) times daily.      melatonin 3 mg Cap Take 1 capsule by mouth every evening.      topiramate (TOPAMAX) 50 MG tablet Take 50 mg by mouth Daily.      VITAMIN D3 50 mcg (2,000 unit) tablet Take 1 tablet by mouth Daily.      ZOLMitriptan (ZOMIG) 5 MG tablet Take 5 mg by mouth as needed for Migraine.      [DISCONTINUED] metFORMIN (GLUCOPHAGE) 1000 MG tablet Take 0.5 tablets (500 mg total) by mouth 2 (two) times daily with meals. 180 tablet 1    [DISCONTINUED] tamsulosin (FLOMAX) 0.4 mg Cap Take 1 capsule (0.4 mg total) by mouth once daily. 90 capsule 1       Allergies  Review of patient's allergies indicates:  No Known Allergies    Physical Examination     Vitals:    03/23/23 0855 03/23/23 0951   BP: (!) 144/86 (!) 140/88   BP Location: Left arm Left arm   Patient Position: Sitting Sitting   Pulse: 84    Resp: 18    Temp: 97.9 °F (36.6 °C)    TempSrc: Oral    SpO2: 97%    Weight: 98.9 kg (218 lb)    Height: 5' 7" (1.702 m)       Physical Exam  Vitals and nursing note reviewed.   Constitutional:       General: He is not in acute distress.     Appearance: Normal appearance. He is not ill-appearing or toxic-appearing.   HENT:      Right Ear: External ear normal.      Left Ear: External ear normal.      Nose: Nose normal.      Mouth/Throat:      Mouth: Mucous membranes are moist.      Pharynx: Oropharynx is clear.   Eyes:      " Conjunctiva/sclera: Conjunctivae normal.      Pupils: Pupils are equal, round, and reactive to light.   Cardiovascular:      Rate and Rhythm: Normal rate and regular rhythm.      Heart sounds: Normal heart sounds.   Pulmonary:      Breath sounds: Normal breath sounds.   Musculoskeletal:      Cervical back: Normal range of motion and neck supple.      Right knee: Swelling and crepitus present.      Left knee: Decreased range of motion.   Skin:     General: Skin is warm.      Capillary Refill: Capillary refill takes less than 2 seconds.   Neurological:      Mental Status: He is alert.        Assessment and Plan (including Health Maintenance)      Problem List  Smart Sets  Document Outside HM   :    Plan:     There are no Patient Instructions on file for this visit.     Health Maintenance Due   Topic Date Due    Eye Exam  Never done    Low Dose Statin  Never done    Lipid Panel  11/15/2022    Hemoglobin A1c  12/17/2022       Problem List Items Addressed This Visit          Cardiac/Vascular    Hypertension    Current Assessment & Plan     Not to goal, - work on diet, specifically cutting back bread, breaded things, cereal, pasta, potatoes, rice  - try to exercise at least 150min a week divided however you want  - if you can do weight, even very light weight do them  - try not to use to much salt, if any, remember that things that are preserved or frozen often contain large amounts of salt as a preserve         Relevant Orders    Comprehensive Metabolic Panel    CBC Auto Differential    Hyperlipidemia    Current Assessment & Plan     Labs today will continue to monitor follow lflc diet and increase physical activity            Renal/    Benign prostatic hyperplasia with urinary hesitancy    Current Assessment & Plan     flomax  The current medical regimen is effective;  continue present plan and medications.          Relevant Medications    tamsulosin (FLOMAX) 0.4 mg Cap       Endocrine    Type 2 diabetes mellitus with  hyperglycemia, with long-term current use of insulin - Primary    Current Assessment & Plan     Lab today - work on diet, specifically cutting back bread, breaded things, cereal, pasta, potatoes, rice  - try to exercise at least 150min a week divided however you want  - if you can do weight, even very light weight do them  - keep an eye on sugars, fasting sugar goal , after eating no greater than 180  - A1C goal is around 7.0%  - continue current regiment and please note if any changes were made         Relevant Medications    metFORMIN (GLUCOPHAGE) 1000 MG tablet    Other Relevant Orders    Hemoglobin A1C    LDL Cholesterol, Direct       Orthopedic    Chronic pain of right knee    Current Assessment & Plan     Trial of voltaren if needing ortho referral notify when ready          Other Visit Diagnoses       Screening for malignant neoplasm of prostate        Relevant Orders    PSA, Screening          Type 2 diabetes mellitus with hyperglycemia, with long-term current use of insulin  -     Hemoglobin A1C; Future; Expected date: 03/23/2023  -     metFORMIN (GLUCOPHAGE) 1000 MG tablet; Take 0.5 tablets (500 mg total) by mouth 2 (two) times daily with meals.  Dispense: 180 tablet; Refill: 1  -     LDL Cholesterol, Direct; Future; Expected date: 03/23/2023    Hypertension, unspecified type  -     Comprehensive Metabolic Panel; Future; Expected date: 03/23/2023  -     CBC Auto Differential; Future; Expected date: 03/23/2023    Screening for malignant neoplasm of prostate  -     PSA, Screening; Future; Expected date: 03/23/2023    Hyperlipidemia, unspecified hyperlipidemia type    Benign prostatic hyperplasia with urinary hesitancy  -     tamsulosin (FLOMAX) 0.4 mg Cap; Take 1 capsule (0.4 mg total) by mouth once daily.  Dispense: 90 capsule; Refill: 1    Chronic pain of right knee       Health Maintenance Topics with due status: Not Due       Topic Last Completion Date    Diabetes Urine Screening 06/17/2022    Foot  Exam 03/23/2023       Procedures     Future Appointments   Date Time Provider Department Center   9/19/2023  8:15 AM LAWRENCE Parks [facility]        No follow-ups on file.     Signature:  LAWRENCE Parks    Date of encounter: 3/23/23

## 2023-03-23 NOTE — PROGRESS NOTES
Protective Sensation (w/ 10 gram monofilament):  Right: Intact  Left: Intact    Visual Inspection:  Normal -  Bilateral and Dry Skin -  Bilateral    Pedal Pulses:   Right: Present  Left: Present    Posterior Tibialis Pulses:   Right:Present  Left: Present

## 2023-03-24 DIAGNOSIS — Z79.4 TYPE 2 DIABETES MELLITUS WITH HYPERGLYCEMIA, WITH LONG-TERM CURRENT USE OF INSULIN: ICD-10-CM

## 2023-03-24 DIAGNOSIS — E78.5 HYPERLIPIDEMIA, UNSPECIFIED HYPERLIPIDEMIA TYPE: Primary | ICD-10-CM

## 2023-03-24 DIAGNOSIS — E11.65 TYPE 2 DIABETES MELLITUS WITH HYPERGLYCEMIA, WITH LONG-TERM CURRENT USE OF INSULIN: ICD-10-CM

## 2023-03-24 RX ORDER — LANCETS
1 EACH MISCELLANEOUS 2 TIMES DAILY
Qty: 100 EACH | Refills: 11 | Status: SHIPPED | OUTPATIENT
Start: 2023-03-24

## 2023-03-24 RX ORDER — INSULIN PUMP SYRINGE, 3 ML
EACH MISCELLANEOUS
Qty: 1 EACH | Refills: 0 | Status: SHIPPED | OUTPATIENT
Start: 2023-03-24 | End: 2024-03-23

## 2023-03-24 RX ORDER — PRAVASTATIN SODIUM 10 MG/1
10 TABLET ORAL DAILY
Qty: 90 TABLET | Refills: 0 | Status: SHIPPED | OUTPATIENT
Start: 2023-03-24 | End: 2023-06-22

## 2023-03-24 NOTE — PROGRESS NOTES
Diabetes not controlled change metformin to 1000 mg twice daily start checking glucose at least daily- does he want to meet with Ms Marie for diabetic education?  PSA ok  LDL elevated start pravastatin 10 mg daily, follow up 3 months and get fasting labs

## 2023-06-22 DIAGNOSIS — E78.5 HYPERLIPIDEMIA, UNSPECIFIED HYPERLIPIDEMIA TYPE: ICD-10-CM

## 2023-06-22 RX ORDER — PRAVASTATIN SODIUM 10 MG/1
TABLET ORAL
Qty: 90 TABLET | Refills: 0 | Status: SHIPPED | OUTPATIENT
Start: 2023-06-22 | End: 2023-09-09

## 2023-09-06 ENCOUNTER — OFFICE VISIT (OUTPATIENT)
Dept: FAMILY MEDICINE | Facility: CLINIC | Age: 58
End: 2023-09-06
Payer: OTHER GOVERNMENT

## 2023-09-06 VITALS
RESPIRATION RATE: 18 BRPM | HEART RATE: 94 BPM | DIASTOLIC BLOOD PRESSURE: 84 MMHG | WEIGHT: 205.81 LBS | HEIGHT: 67 IN | TEMPERATURE: 97 F | OXYGEN SATURATION: 98 % | BODY MASS INDEX: 32.3 KG/M2 | SYSTOLIC BLOOD PRESSURE: 130 MMHG

## 2023-09-06 DIAGNOSIS — R39.11 BENIGN PROSTATIC HYPERPLASIA WITH URINARY HESITANCY: ICD-10-CM

## 2023-09-06 DIAGNOSIS — I10 PRIMARY HYPERTENSION: ICD-10-CM

## 2023-09-06 DIAGNOSIS — E11.65 TYPE 2 DIABETES MELLITUS WITH HYPERGLYCEMIA, WITHOUT LONG-TERM CURRENT USE OF INSULIN: Primary | ICD-10-CM

## 2023-09-06 DIAGNOSIS — N40.1 BENIGN PROSTATIC HYPERPLASIA WITH URINARY HESITANCY: ICD-10-CM

## 2023-09-06 DIAGNOSIS — E78.2 MIXED HYPERLIPIDEMIA: ICD-10-CM

## 2023-09-06 PROBLEM — S62.639B OPEN FRACTURE OF DISTAL PHALANX OF FINGER: Status: RESOLVED | Noted: 2021-11-15 | Resolved: 2023-09-06

## 2023-09-06 LAB
ALBUMIN SERPL BCP-MCNC: 3.6 G/DL (ref 3.5–5)
ALBUMIN/GLOB SERPL: 1 {RATIO}
ALP SERPL-CCNC: 110 U/L (ref 45–115)
ALT SERPL W P-5'-P-CCNC: 27 U/L (ref 16–61)
ANION GAP SERPL CALCULATED.3IONS-SCNC: 14 MMOL/L (ref 7–16)
AST SERPL W P-5'-P-CCNC: 15 U/L (ref 15–37)
BILIRUB SERPL-MCNC: 0.4 MG/DL (ref ?–1.2)
BUN SERPL-MCNC: 13 MG/DL (ref 7–18)
BUN/CREAT SERPL: 10 (ref 6–20)
CALCIUM SERPL-MCNC: 8.7 MG/DL (ref 8.5–10.1)
CHLORIDE SERPL-SCNC: 102 MMOL/L (ref 98–107)
CHOLEST SERPL-MCNC: 228 MG/DL (ref 0–200)
CHOLEST/HDLC SERPL: 3.7 {RATIO}
CO2 SERPL-SCNC: 25 MMOL/L (ref 21–32)
CREAT SERPL-MCNC: 1.3 MG/DL (ref 0.7–1.3)
CREAT UR-MCNC: 155 MG/DL (ref 39–259)
EGFR (NO RACE VARIABLE) (RUSH/TITUS): 63 ML/MIN/1.73M2
EST. AVERAGE GLUCOSE BLD GHB EST-MCNC: 327 MG/DL
GLOBULIN SER-MCNC: 3.7 G/DL (ref 2–4)
GLUCOSE SERPL-MCNC: 295 MG/DL (ref 70–110)
GLUCOSE SERPL-MCNC: 349 MG/DL (ref 74–106)
HBA1C MFR BLD HPLC: 12.4 % (ref 4.5–6.6)
HDLC SERPL-MCNC: 61 MG/DL (ref 40–60)
LDLC SERPL CALC-MCNC: 137 MG/DL
LDLC/HDLC SERPL: 2.2 {RATIO}
MICROALBUMIN UR-MCNC: 0.6 MG/DL (ref 0–2.8)
MICROALBUMIN/CREAT RATIO PNL UR: 3.9 MG/G (ref 0–30)
NONHDLC SERPL-MCNC: 167 MG/DL
POTASSIUM SERPL-SCNC: 3.7 MMOL/L (ref 3.5–5.1)
PROT SERPL-MCNC: 7.3 G/DL (ref 6.4–8.2)
SODIUM SERPL-SCNC: 137 MMOL/L (ref 136–145)
TRIGL SERPL-MCNC: 152 MG/DL (ref 35–150)
VLDLC SERPL-MCNC: 30 MG/DL

## 2023-09-06 PROCEDURE — 82570 ASSAY OF URINE CREATININE: CPT | Mod: ,,, | Performed by: CLINICAL MEDICAL LABORATORY

## 2023-09-06 PROCEDURE — 82043 MICROALBUMIN / CREATININE RATIO URINE: ICD-10-PCS | Mod: ,,, | Performed by: CLINICAL MEDICAL LABORATORY

## 2023-09-06 PROCEDURE — 80053 COMPREHEN METABOLIC PANEL: CPT | Mod: ,,, | Performed by: CLINICAL MEDICAL LABORATORY

## 2023-09-06 PROCEDURE — 82043 UR ALBUMIN QUANTITATIVE: CPT | Mod: ,,, | Performed by: CLINICAL MEDICAL LABORATORY

## 2023-09-06 PROCEDURE — 83036 HEMOGLOBIN GLYCOSYLATED A1C: CPT | Mod: ,,, | Performed by: CLINICAL MEDICAL LABORATORY

## 2023-09-06 PROCEDURE — 80061 LIPID PANEL: ICD-10-PCS | Mod: ,,, | Performed by: CLINICAL MEDICAL LABORATORY

## 2023-09-06 PROCEDURE — 83036 HEMOGLOBIN A1C: ICD-10-PCS | Mod: ,,, | Performed by: CLINICAL MEDICAL LABORATORY

## 2023-09-06 PROCEDURE — 82570 MICROALBUMIN / CREATININE RATIO URINE: ICD-10-PCS | Mod: ,,, | Performed by: CLINICAL MEDICAL LABORATORY

## 2023-09-06 PROCEDURE — 99214 PR OFFICE/OUTPT VISIT, EST, LEVL IV, 30-39 MIN: ICD-10-PCS | Mod: ,,, | Performed by: STUDENT IN AN ORGANIZED HEALTH CARE EDUCATION/TRAINING PROGRAM

## 2023-09-06 PROCEDURE — 80061 LIPID PANEL: CPT | Mod: ,,, | Performed by: CLINICAL MEDICAL LABORATORY

## 2023-09-06 PROCEDURE — 99214 OFFICE O/P EST MOD 30 MIN: CPT | Mod: ,,, | Performed by: STUDENT IN AN ORGANIZED HEALTH CARE EDUCATION/TRAINING PROGRAM

## 2023-09-06 PROCEDURE — 80053 COMPREHENSIVE METABOLIC PANEL: ICD-10-PCS | Mod: ,,, | Performed by: CLINICAL MEDICAL LABORATORY

## 2023-09-06 RX ORDER — ATOGEPANT 60 MG/1
1 TABLET ORAL
COMMUNITY
Start: 2023-08-28

## 2023-09-06 RX ORDER — METFORMIN HYDROCHLORIDE 1000 MG/1
1000 TABLET ORAL 2 TIMES DAILY WITH MEALS
Qty: 180 TABLET | Refills: 1 | Status: SHIPPED | OUTPATIENT
Start: 2023-09-06 | End: 2023-09-27

## 2023-09-06 NOTE — PROGRESS NOTES
Progress Note     VICKIE ALEJANDRE MD   [address]  [place] [address]     PATIENT NAME: Eliazar Bermudez  : 1965  DATE: 23  MRN: [id number]      Billing Provider: VICKIE ALEJANDRE MD  Level of Service: AL OFFICE/OUTPT VISIT, EST, LEVL IV, 30-39 MIN  Patient PCP Information       Provider PCP Type    [facility]                Establish Care and Health Maintenance (Pt is due for diabetic eye exam/Pt requested to be referred to [facility]/Pt is due for a diabetes urine screening)      SUBJECTIVE:     Eliazar Bermudez is a 58 y.o.male who presents to clinic for Establish Care and Health Maintenance (Pt is due for diabetic eye exam/Pt requested to be referred to [facility]/Pt is due for a diabetes urine screening)    Patient presents to clinic today for establishment of care.  Patient has a history of type 2 diabetes.  Patient is not currently taking anything for his type 2 diabetes.  Patient's last hemoglobin A1c in March was greater than 10.  Patient has never considered himself a diabetic.  Patient notes he did take metformin for time but was told he was taking that to help prevent development of diabetes.  Patient does not monitor his blood glucose levels at home.  Patient does have some intermittent polyuria.    Patient also has a history of hyperlipidemia.  Patient was prescribed pravastatin in the past.  Patient is not currently taking this medication.    Patient also has a history of migraines for which he is followed by Dr. Silver in [place].  Patient takes Quilipta and Zomig.     Patient also has a history of BPH for which he takes Flomax.  He notes it typically does work well.      Patient also has a history of anxiety and mood disorder for which he takes Klonopin and Elavil.  [facility]  Patient also has a history of vitamin-D deficiency and is [facility].  They monitor  his levels twice yearly.  He takes melatonin for sleep and notes it does work well.  He denies any adverse side effects.      Past Medical History:  has a past medical history of Chronic migraine without aura, History of closed head injury (08/08/2018), Joint pain, Knee pain, right, and Migraines.   Past Surgical History:  has a past surgical history that includes Total knee arthroplasty; Knee surgery; Knee arthroscopy; [place] tooth extraction; Fracture surgery (05/25/2009); and Joint replacement (09/22/2012).  Family History: family history includes Arthritis in his maternal grandmother and mother; COPD in his brother and mother; Diabetes in his maternal grandmother; Heart attack in his maternal grandfather.  Social History:  reports that he has never smoked. He has never been exposed to tobacco smoke. He has never used smokeless tobacco. He reports current alcohol use. He reports that he does not use drugs.  Allergies: Review of patient's allergies indicates:  No Known Allergies      Current Outpatient Medications:     amitriptyline (ELAVIL) 100 MG tablet, amitriptyline 100 mg tablet, Disp: , Rfl:     clonazePAM (KLONOPIN) 0.5 MG tablet, Take 1 mg by mouth 2 (two) times daily., Disp: , Rfl:     melatonin 3 mg Cap, Take 1 capsule by mouth every evening., Disp: , Rfl:     QULIPTA 60 mg Tab, Take 1 tablet by mouth., Disp: , Rfl:     tamsulosin (FLOMAX) 0.4 mg Cap, Take 1 capsule (0.4 mg total) by mouth once daily., Disp: 90 capsule, Rfl: 1    VITAMIN D3 50 mcg (2,000 unit) tablet, Take 1 tablet by mouth Daily., Disp: , Rfl:     ZOLMitriptan (ZOMIG) 5 MG tablet, Take 5 mg by mouth as needed for Migraine., Disp: , Rfl:     blood sugar diagnostic Strp, 1 strip by Misc.(Non-Drug; Combo Route) route 2 (two) times a day. (Patient not taking: Reported on 9/6/2023), Disp: 70 each, Rfl: 11    blood-glucose meter kit, Use as instructed (Patient not taking: Reported on 9/6/2023), Disp: 1 each, Rfl: 0    lancets (LANCETS,THIN)  "Misc, 1 each by Misc.(Non-Drug; Combo Route) route 2 (two) times a day. (Patient not taking: Reported on 9/6/2023), Disp: 100 each, Rfl: 11    metFORMIN (GLUCOPHAGE) 1000 MG tablet, Take 1 tablet (1,000 mg total) by mouth 2 (two) times daily with meals., Disp: 180 tablet, Rfl: 1    pravastatin (PRAVACHOL) 10 MG tablet, TAKE 1 TABLET BY MOUTH EVERY DAY (Patient not taking: Reported on 9/6/2023), Disp: 90 tablet, Rfl: 0   OBJECTIVE:     Vital Signs   /84 (BP Location: Left arm, Patient Position: Sitting, BP Method: Large (Manual))   Pulse 94   Temp 97.4 °F (36.3 °C) (Temporal)   Resp 18   Ht 5' 7" (1.702 m)   Wt 93.4 kg (205 lb 12.8 oz)   SpO2 98%   BMI 32.23 kg/m²     Physical Exam  Constitutional:       General: He is not in acute distress.     Appearance: Normal appearance. He is not ill-appearing.   HENT:      Head: Normocephalic and atraumatic.   Eyes:      Extraocular Movements: Extraocular movements intact.      Pupils: Pupils are equal, round, and reactive to light.   Cardiovascular:      Rate and Rhythm: Normal rate and regular rhythm.      Heart sounds: No murmur heard.     No friction rub. No gallop.   Pulmonary:      Effort: Pulmonary effort is normal. No respiratory distress.      Breath sounds: Normal breath sounds. No wheezing, rhonchi or rales.   Abdominal:      Palpations: Abdomen is soft.      Tenderness: There is no abdominal tenderness. There is no guarding or rebound.   Musculoskeletal:         General: Normal range of motion.   Skin:     General: Skin is warm and dry.      Capillary Refill: Capillary refill takes less than 2 seconds.   Neurological:      General: No focal deficit present.      Mental Status: He is alert.   Psychiatric:         Mood and Affect: Mood normal.         Behavior: Behavior normal.         ASSESSMENT/PLAN:     1. Type 2 diabetes mellitus with hyperglycemia, without long-term current use of insulin  -     Comprehensive Metabolic Panel; Future; Expected date: " 09/06/2023  -     Hemoglobin A1C; Future; Expected date: 09/06/2023  -     Microalbumin/Creatinine Ratio, Urine  -     Ambulatory referral/consult to Ophthalmology; Future; Expected date: 09/13/2023  -     metFORMIN (GLUCOPHAGE) 1000 MG tablet; Take 1 tablet (1,000 mg total) by mouth 2 (two) times daily with meals.  Dispense: 180 tablet; Refill: 1  -     Lipid Panel; Future; Expected date: 09/06/2023  -     POCT glucose  Patient has a history of type 2 diabetes.  Patient is not currently on any medication.  We will obtain hemoglobin A1c and microalbumin/creatinine ratio.  Patient is up-to-date on his foot exam.  We will refer for eye exam.  We will restart metformin.  We will also obtain lipid panel and CMP particularly given elevated liver enzymes on most recent CMP.  Patient will likely need additional medication after starting metformin.  Discussed possibly starting Jardiance after we receive his hemoglobin A1c.  Patient would be willing to start this medication.  Patient also advised to start checking his blood glucose levels twice daily.  Patient verbalized understanding.    2. Mixed hyperlipidemia  -     Lipid Panel; Future; Expected date: 09/06/2023  Patient has a history of mixed hyperlipidemia.  Patient has a lipid panel from 2021 which was unremarkable.  Patient did have an LDL performed earlier this year and it was within normal limits.  We will obtain complete lipid panel particularly given his diabetes.  Patient will likely benefit from statin therapy.  We will discuss when we call patient with results.    3. Primary hypertension  -     Comprehensive Metabolic Panel; Future; Expected date: 09/06/2023  Patient does have history of hypertension listed as a diagnosis.  Patient has had hypertension in the past but his blood pressure is currently well-controlled.  Patient is not currently on any medication for hypertension.  We will continue to monitor.  Pending results of urine microalbumin creatinine  ratio, patient will possibly need to be started on lisinopril given his diabetes.  His blood pressure will likely tolerate this without difficulty.    4.BPH  Patient with normal PSA earlier this year.  Patient to continue Flomax.    Follow up in about 3 months (around 12/6/2023) for Recheck diabetes..      VICKIE ALEJANDRE MD  09/06/2023

## 2023-09-09 RX ORDER — ATORVASTATIN CALCIUM 40 MG/1
40 TABLET, FILM COATED ORAL DAILY
Qty: 90 TABLET | Refills: 3 | Status: SHIPPED | OUTPATIENT
Start: 2023-09-09 | End: 2023-09-27

## 2023-09-14 DIAGNOSIS — R39.11 BENIGN PROSTATIC HYPERPLASIA WITH URINARY HESITANCY: ICD-10-CM

## 2023-09-14 DIAGNOSIS — N40.1 BENIGN PROSTATIC HYPERPLASIA WITH URINARY HESITANCY: ICD-10-CM

## 2023-09-14 RX ORDER — TAMSULOSIN HYDROCHLORIDE 0.4 MG/1
0.4 CAPSULE ORAL DAILY
Qty: 90 CAPSULE | Refills: 1 | Status: SHIPPED | OUTPATIENT
Start: 2023-09-14 | End: 2024-01-30 | Stop reason: SDUPTHER

## 2023-09-27 DIAGNOSIS — E11.65 TYPE 2 DIABETES MELLITUS WITH HYPERGLYCEMIA, WITHOUT LONG-TERM CURRENT USE OF INSULIN: ICD-10-CM

## 2023-09-27 RX ORDER — ATORVASTATIN CALCIUM 40 MG/1
40 TABLET, FILM COATED ORAL NIGHTLY
Qty: 90 TABLET | Refills: 1 | Status: SHIPPED | OUTPATIENT
Start: 2023-09-27

## 2023-09-27 RX ORDER — METFORMIN HYDROCHLORIDE 1000 MG/1
1000 TABLET ORAL 2 TIMES DAILY WITH MEALS
Qty: 180 TABLET | Refills: 1 | Status: SHIPPED | OUTPATIENT
Start: 2023-09-27

## 2023-11-14 ENCOUNTER — PATIENT MESSAGE (OUTPATIENT)
Dept: ADMINISTRATIVE | Facility: HOSPITAL | Age: 58
End: 2023-11-14

## 2024-01-17 DIAGNOSIS — Z12.11 SCREENING FOR COLON CANCER: ICD-10-CM

## 2024-01-29 PROBLEM — G47.33 OBSTRUCTIVE SLEEP APNEA SYNDROME: Status: ACTIVE | Noted: 2024-01-29

## 2024-01-29 PROBLEM — G43.909 MIGRAINE HEADACHE: Status: ACTIVE | Noted: 2024-01-29

## 2024-01-29 PROBLEM — G89.4 CHRONIC PAIN SYNDROME: Status: ACTIVE | Noted: 2024-01-29

## 2024-01-29 PROBLEM — H93.19 TINNITUS: Status: ACTIVE | Noted: 2024-01-29

## 2024-01-29 PROBLEM — F51.5 NIGHTMARE DISORDER: Status: ACTIVE | Noted: 2024-01-29

## 2024-01-29 PROBLEM — N52.9 ERECTILE DYSFUNCTION: Status: ACTIVE | Noted: 2024-01-29

## 2024-01-29 PROBLEM — F06.30 MOOD DISORDER DUE TO A GENERAL MEDICAL CONDITION: Status: ACTIVE | Noted: 2024-01-29

## 2024-01-29 PROBLEM — E55.9 VITAMIN D DEFICIENCY: Status: ACTIVE | Noted: 2024-01-29

## 2024-01-29 NOTE — PROGRESS NOTES
Progress Note     VICKIE ALEJANDRE MD   [address]  [place] [address]     PATIENT NAME: Eliazar Bermudez  : 1965  DATE: 24  MRN: [id number]      Billing Provider: VICKIE ALEJANDRE MD  Level of Service: AL OFFICE/OUTPT VISIT, EST, LEVAMARILYS IV, 30-39 MIN  Patient PCP Information       Provider PCP Type    VICKIE ALEJANDRE MD General                Follow-up (F/u appt last seen 9/3/2023.) and Health Maintenance (Pt not interested in any vaccines today)      SUBJECTIVE:     Eliazar Bermudez is a 58 y.o.male who presents to clinic for Follow-up (F/u appt last seen 9/3/2023.) and Health Maintenance (Pt not interested in any vaccines today)    Who presents to clinic today for follow up for type 2 diabetes.  Patient was last evaluated in 2023.  Patient with hemoglobin A1c greater than 12 that time.  Patient was prescribed Jardiance to start in addition to his metformin.  Patient did not start this medication.  Patient would be willing to start it after further discussion.  Patient does not monitor his blood glucose levels at home.    Patient also has a history of BPH.  Patient takes Flomax with improvement in symptoms.    Patient also has a itchy rash around the base of his neck.  He notes has been ongoing for the past few weeks.  Patient has not put anything on his neck.    Normally gets [facility]oguard. [facility]     Declines hep C testing but would be interested at follow up appointment.    All other pertinent review of systems negative. Please see HPI for details.     Past Medical History:  has a past medical history of Chronic migraine without aura, History of closed head injury (2018), Joint pain, Knee pain, right, Migraines, and Open fracture of distal phalanx of finger (11/15/2021).   Past Surgical History:  has a past surgical history that includes Total knee arthroplasty; Knee surgery; Knee arthroscopy; [place] tooth extraction; Fracture surgery  (05/25/2009); and Joint replacement (09/22/2012).  Family History: family history includes Arthritis in his maternal grandmother and mother; COPD in his brother and mother; Diabetes in his maternal grandmother; Heart attack in his maternal grandfather.  Social History:  reports that he has never smoked. He has never been exposed to tobacco smoke. He has never used smokeless tobacco. He reports current alcohol use. He reports that he does not use drugs.  Allergies: Review of patient's allergies indicates:  No Known Allergies      Current Outpatient Medications:     amitriptyline (ELAVIL) 100 MG tablet, amitriptyline 100 mg tablet, Disp: , Rfl:     atorvastatin (LIPITOR) 40 MG tablet, Take 1 tablet (40 mg total) by mouth every evening., Disp: 90 tablet, Rfl: 1    clonazePAM (KLONOPIN) 0.5 MG tablet, Take 1 mg by mouth 2 (two) times daily., Disp: , Rfl:     melatonin 3 mg Cap, Take 1 capsule by mouth every evening., Disp: , Rfl:     metFORMIN (GLUCOPHAGE) 1000 MG tablet, Take 1 tablet (1,000 mg total) by mouth 2 (two) times daily with meals., Disp: 180 tablet, Rfl: 1    QULIPTA 60 mg Tab, Take 1 tablet by mouth., Disp: , Rfl:     sildenafiL (VIAGRA) 100 MG tablet, TAKE ONE-HALF TABLET BY MOUTH  AS NEEDED 1 HOUR BEFORE SEX; MAX OF ONCE A DAY OR TWICE A WEEK; DON'T TAKE WITH OTHER NITRATES, Disp: , Rfl:     VITAMIN D3 50 mcg (2,000 unit) tablet, Take 1 tablet by mouth Daily., Disp: , Rfl:     ZOLMitriptan (ZOMIG) 5 MG tablet, Take 5 mg by mouth as needed for Migraine., Disp: , Rfl:     blood sugar diagnostic Strp, 1 strip by Misc.(Non-Drug; Combo Route) route 2 (two) times a day. (Patient not taking: Reported on 9/6/2023), Disp: 70 each, Rfl: 11    blood-glucose meter kit, Use as instructed (Patient not taking: Reported on 9/6/2023), Disp: 1 each, Rfl: 0    empagliflozin (JARDIANCE) 10 mg tablet, Take 1 tablet (10 mg total) by mouth once daily., Disp: 90 tablet, Rfl: 1    lancets (LANCETS,THIN) Misc, 1 each by  "Misc.(Non-Drug; Combo Route) route 2 (two) times a day. (Patient not taking: Reported on 9/6/2023), Disp: 100 each, Rfl: 11    tamsulosin (FLOMAX) 0.4 mg Cap, Take 1 capsule (0.4 mg total) by mouth once daily., Disp: 90 capsule, Rfl: 1    triamcinolone acetonide 0.1% (KENALOG) 0.1 % cream, Apply topically 2 (two) times daily., Disp: 15 g, Rfl: 0   OBJECTIVE:     Vital Signs   /84 (BP Location: Left arm, Patient Position: Sitting, BP Method: Large (Manual))   Pulse 90   Temp 97.9 °F (36.6 °C) (Temporal)   Resp 18   Ht 5' 7" (1.702 m)   Wt 96.7 kg (213 lb 3.2 oz)   SpO2 99%   BMI 33.39 kg/m²     Physical Exam  Constitutional:       General: He is not in acute distress.     Appearance: Normal appearance. He is not ill-appearing.   HENT:      Head: Normocephalic and atraumatic.   Eyes:      Extraocular Movements: Extraocular movements intact.      Pupils: Pupils are equal, round, and reactive to light.   Cardiovascular:      Rate and Rhythm: Normal rate and regular rhythm.      Heart sounds: No murmur heard.     No friction rub. No gallop.   Pulmonary:      Effort: Pulmonary effort is normal. No respiratory distress.      Breath sounds: Normal breath sounds. No wheezing, rhonchi or rales.   Abdominal:      Palpations: Abdomen is soft.      Tenderness: There is no abdominal tenderness. There is no guarding or rebound.   Musculoskeletal:         General: Normal range of motion.   Skin:     General: Skin is warm and dry.      Capillary Refill: Capillary refill takes less than 2 seconds.   Neurological:      General: No focal deficit present.      Mental Status: He is alert.   Psychiatric:         Mood and Affect: Mood normal.         Behavior: Behavior normal.         ASSESSMENT/PLAN:     1. Type 2 diabetes mellitus with hyperglycemia, without long-term current use of insulin  -     Hemoglobin A1C; Future; Expected date: 01/30/2024  -     empagliflozin (JARDIANCE) 10 mg tablet; Take 1 tablet (10 mg total) by " mouth once daily.  Dispense: 90 tablet; Refill: 1    Patient has a history of type 2 diabetes.  It is currently uncontrolled.  Patient did not start Jardiance as instructed.  We will prescription.  We will obtain hemoglobin A1c.  Patient to continue metformin.  Counseled on importance of monitoring blood glucose levels daily.    2. Primary hypertension  -     CBC Auto Differential; Future; Expected date: 01/30/2024    Blood pressure currently well-controlled.  We will continue to monitor.    3. Vitamin D deficiency  -     Vitamin D; Future; Expected date: 01/30/2024    Patient with a history of vitamin-D deficiency.  We will obtain vitamin-D level.  Patient currently taking vitamin-D daily.    4. Benign prostatic hyperplasia with urinary hesitancy  -     tamsulosin (FLOMAX) 0.4 mg Cap; Take 1 capsule (0.4 mg total) by mouth once daily.  Dispense: 90 capsule; Refill: 1    Patient with history of BPH.  Patient to continue Flomax.  Patient up-to-date on PSA.    5. Rash  -     triamcinolone acetonide 0.1% (KENALOG) 0.1 % cream; Apply topically 2 (two) times daily.  Dispense: 15 g; Refill: 0    Patient with rash on neck.  Possibly allergic in nature.  We will provide triamcinolone cream.  If rash dos not improve, can refer to Dermatology for further evaluation.    Follow up in about 3 months (around 4/30/2024) for Recheck DMII.      VICKIE ALEJANDRE MD  01/30/2024    Due to voice recognition software, sound alike and misspelled words may be contained in the documentation.

## 2024-01-30 ENCOUNTER — OFFICE VISIT (OUTPATIENT)
Dept: FAMILY MEDICINE | Facility: CLINIC | Age: 59
End: 2024-01-30
Payer: OTHER GOVERNMENT

## 2024-01-30 VITALS
SYSTOLIC BLOOD PRESSURE: 138 MMHG | OXYGEN SATURATION: 99 % | HEART RATE: 90 BPM | WEIGHT: 213.19 LBS | HEIGHT: 67 IN | TEMPERATURE: 98 F | DIASTOLIC BLOOD PRESSURE: 84 MMHG | RESPIRATION RATE: 18 BRPM | BODY MASS INDEX: 33.46 KG/M2

## 2024-01-30 DIAGNOSIS — R21 RASH: ICD-10-CM

## 2024-01-30 DIAGNOSIS — E55.9 VITAMIN D DEFICIENCY: ICD-10-CM

## 2024-01-30 DIAGNOSIS — R39.11 BENIGN PROSTATIC HYPERPLASIA WITH URINARY HESITANCY: ICD-10-CM

## 2024-01-30 DIAGNOSIS — I10 PRIMARY HYPERTENSION: ICD-10-CM

## 2024-01-30 DIAGNOSIS — E11.65 TYPE 2 DIABETES MELLITUS WITH HYPERGLYCEMIA, WITHOUT LONG-TERM CURRENT USE OF INSULIN: Primary | ICD-10-CM

## 2024-01-30 DIAGNOSIS — N40.1 BENIGN PROSTATIC HYPERPLASIA WITH URINARY HESITANCY: ICD-10-CM

## 2024-01-30 LAB
25(OH)D3 SERPL-MCNC: 44.6 NG/ML
BASOPHILS # BLD AUTO: 0.07 K/UL (ref 0–0.2)
BASOPHILS NFR BLD AUTO: 0.9 % (ref 0–1)
DIFFERENTIAL METHOD BLD: ABNORMAL
EOSINOPHIL # BLD AUTO: 0.59 K/UL (ref 0–0.5)
EOSINOPHIL NFR BLD AUTO: 7.6 % (ref 1–4)
ERYTHROCYTE [DISTWIDTH] IN BLOOD BY AUTOMATED COUNT: 12.9 % (ref 11.5–14.5)
EST. AVERAGE GLUCOSE BLD GHB EST-MCNC: 166 MG/DL
HBA1C MFR BLD HPLC: 7.4 % (ref 4.5–6.6)
HCT VFR BLD AUTO: 41.2 % (ref 40–54)
HGB BLD-MCNC: 13.8 G/DL (ref 13.5–18)
IMM GRANULOCYTES # BLD AUTO: 0.03 K/UL (ref 0–0.04)
IMM GRANULOCYTES NFR BLD: 0.4 % (ref 0–0.4)
LYMPHOCYTES # BLD AUTO: 2.82 K/UL (ref 1–4.8)
LYMPHOCYTES NFR BLD AUTO: 36.4 % (ref 27–41)
MCH RBC QN AUTO: 29.4 PG (ref 27–31)
MCHC RBC AUTO-ENTMCNC: 33.5 G/DL (ref 32–36)
MCV RBC AUTO: 87.7 FL (ref 80–96)
MONOCYTES # BLD AUTO: 0.71 K/UL (ref 0–0.8)
MONOCYTES NFR BLD AUTO: 9.2 % (ref 2–6)
MPC BLD CALC-MCNC: 11 FL (ref 9.4–12.4)
NEUTROPHILS # BLD AUTO: 3.52 K/UL (ref 1.8–7.7)
NEUTROPHILS NFR BLD AUTO: 45.5 % (ref 53–65)
NRBC # BLD AUTO: 0 X10E3/UL
NRBC, AUTO (.00): 0 %
PLATELET # BLD AUTO: 214 K/UL (ref 150–400)
RBC # BLD AUTO: 4.7 M/UL (ref 4.6–6.2)
WBC # BLD AUTO: 7.74 K/UL (ref 4.5–11)

## 2024-01-30 PROCEDURE — 99214 OFFICE O/P EST MOD 30 MIN: CPT | Mod: ,,, | Performed by: STUDENT IN AN ORGANIZED HEALTH CARE EDUCATION/TRAINING PROGRAM

## 2024-01-30 PROCEDURE — 82306 VITAMIN D 25 HYDROXY: CPT | Mod: ,,, | Performed by: CLINICAL MEDICAL LABORATORY

## 2024-01-30 PROCEDURE — 85025 COMPLETE CBC W/AUTO DIFF WBC: CPT | Mod: ,,, | Performed by: CLINICAL MEDICAL LABORATORY

## 2024-01-30 PROCEDURE — 83036 HEMOGLOBIN GLYCOSYLATED A1C: CPT | Mod: ,,, | Performed by: CLINICAL MEDICAL LABORATORY

## 2024-01-30 RX ORDER — SILDENAFIL 100 MG/1
TABLET, FILM COATED ORAL
COMMUNITY
Start: 2023-09-01 | End: 2024-09-01

## 2024-01-30 RX ORDER — TRIAMCINOLONE ACETONIDE 1 MG/G
CREAM TOPICAL 2 TIMES DAILY
Qty: 15 G | Refills: 0 | Status: SHIPPED | OUTPATIENT
Start: 2024-01-30

## 2024-01-30 RX ORDER — TAMSULOSIN HYDROCHLORIDE 0.4 MG/1
0.4 CAPSULE ORAL DAILY
Qty: 90 CAPSULE | Refills: 1 | Status: SHIPPED | OUTPATIENT
Start: 2024-01-30

## 2024-01-31 ENCOUNTER — PATIENT OUTREACH (OUTPATIENT)
Dept: ADMINISTRATIVE | Facility: HOSPITAL | Age: 59
End: 2024-01-31

## 2024-01-31 NOTE — LETTER
AUTHORIZATION FOR RELEASE OF   CONFIDENTIAL INFORMATION    Dear [facility],    We are seeing Eliazar Bermudez, date of birth 1965, in the clinic at [facility]. Che Pardo MD is the patient's PCP. Eliazar Bermudez has an outstanding lab/procedure at the time we reviewed his chart. In order to help keep his health information updated, he has authorized us to request the following medical record(s):        (  )  MAMMOGRAM                                      (X)  COLONOSCOPY      (  )  PAP SMEAR                                          (X)  OUTSIDE LAB RESULTS     (  )  DEXA SCAN                                          (  )  EYE EXAM            (  )  FOOT EXAM                                          (  )  ENTIRE RECORD     (  )  OUTSIDE IMMUNIZATIONS                 (  )  _______________         Please fax records to [facility], Keya Dickerson, [phone].     If you have any questions, please contact [phone].          Patient Name: Eliazar Bermudez  : 1965  Patient Phone #: [phone]

## 2024-01-31 NOTE — PROGRESS NOTES
[facility]. [facility] stated [facility]. I am assuming he meant colonoscopy, but is going to request info to see if it was ever done. If not, pt needs to be scheduled to have colonoscopy completed.

## 2024-01-31 NOTE — PROGRESS NOTES
Please let patient know that his vitamin-D level is normal.  His blood count is normal as well.  His hemoglobin A1c is down to 7.4.  Goal for him is less than 7.  He will need to continue metformin and start Jardiance to get his blood sugar under better control.  We will follow up in 3 months for recheck.

## 2024-01-31 NOTE — LETTER
AUTHORIZATION FOR RELEASE OF   CONFIDENTIAL INFORMATION    Dear [facility],    We are seeing Eliazar Bermudez, date of birth 1965, -, in the clinic at [facility]. Che Pardo MD is the patient's PCP. Eliazar Bermudez has an outstanding lab/procedure at the time we reviewed his chart. In order to help keep his health information updated, he has authorized us to request the following medical record(s):        (  )  MAMMOGRAM                                      (X)  COLONOSCOPY      (  )  PAP SMEAR                                          (  )  OUTSIDE LAB RESULTS     (  )  DEXA SCAN                                          (  )  EYE EXAM            (  )  FOOT EXAM                                          (  )  ENTIRE RECORD     (  )  OUTSIDE IMMUNIZATIONS                 (  )  _______________         Please fax records to   [facility]  Keya Dickerson  [address] [place] [address]  Phone: [phone]  Fax:[phone]     If you have any questions, please contact [phone].        Patient Name: Eliazar Bermudez  : 1965  Patient Phone #: [phone]

## 2024-02-02 ENCOUNTER — PATIENT OUTREACH (OUTPATIENT)
Dept: ADMINISTRATIVE | Facility: HOSPITAL | Age: 59
End: 2024-02-02

## 2024-02-02 NOTE — PROGRESS NOTES
[facility] in [place], [place], but no colonoscopy was found. PCP has already order colonoscopy for pt.

## 2024-02-05 LAB
LEFT EYE DM RETINOPATHY: NEGATIVE
RIGHT EYE DM RETINOPATHY: NEGATIVE

## 2024-08-09 ENCOUNTER — PATIENT OUTREACH (OUTPATIENT)
Facility: HOSPITAL | Age: 59
End: 2024-08-09
Payer: OTHER GOVERNMENT

## 2024-09-05 DIAGNOSIS — R21 RASH: ICD-10-CM

## 2024-09-05 RX ORDER — TRIAMCINOLONE ACETONIDE 1 MG/G
CREAM TOPICAL 2 TIMES DAILY
Qty: 15 G | Refills: 44 | OUTPATIENT
Start: 2024-09-05

## 2024-09-11 ENCOUNTER — OFFICE VISIT (OUTPATIENT)
Dept: FAMILY MEDICINE | Facility: CLINIC | Age: 59
End: 2024-09-11
Payer: OTHER GOVERNMENT

## 2024-09-11 VITALS
DIASTOLIC BLOOD PRESSURE: 80 MMHG | HEART RATE: 86 BPM | TEMPERATURE: 98 F | SYSTOLIC BLOOD PRESSURE: 132 MMHG | OXYGEN SATURATION: 97 % | RESPIRATION RATE: 18 BRPM | WEIGHT: 205 LBS | BODY MASS INDEX: 32.18 KG/M2 | HEIGHT: 67 IN

## 2024-09-11 DIAGNOSIS — Z11.59 NEED FOR HEPATITIS C SCREENING TEST: ICD-10-CM

## 2024-09-11 DIAGNOSIS — E11.65 TYPE 2 DIABETES MELLITUS WITH HYPERGLYCEMIA, WITHOUT LONG-TERM CURRENT USE OF INSULIN: ICD-10-CM

## 2024-09-11 DIAGNOSIS — E78.2 MIXED HYPERLIPIDEMIA: ICD-10-CM

## 2024-09-11 DIAGNOSIS — I10 PRIMARY HYPERTENSION: ICD-10-CM

## 2024-09-11 DIAGNOSIS — R39.11 BENIGN PROSTATIC HYPERPLASIA WITH URINARY HESITANCY: Primary | ICD-10-CM

## 2024-09-11 DIAGNOSIS — Z12.5 SCREENING FOR MALIGNANT NEOPLASM OF PROSTATE: ICD-10-CM

## 2024-09-11 DIAGNOSIS — N40.1 BENIGN PROSTATIC HYPERPLASIA WITH URINARY HESITANCY: Primary | ICD-10-CM

## 2024-09-11 DIAGNOSIS — Z11.4 SCREENING FOR HIV (HUMAN IMMUNODEFICIENCY VIRUS): ICD-10-CM

## 2024-09-11 PROBLEM — R51.9 FACIAL PAIN: Status: RESOLVED | Noted: 2021-01-12 | Resolved: 2024-09-11

## 2024-09-11 PROBLEM — G43.909 MIGRAINE HEADACHE: Status: RESOLVED | Noted: 2024-01-29 | Resolved: 2024-09-11

## 2024-09-11 LAB
ALBUMIN SERPL BCP-MCNC: 3.8 G/DL (ref 3.5–5)
ALBUMIN/GLOB SERPL: 1.1 {RATIO}
ALP SERPL-CCNC: 85 U/L (ref 45–115)
ALT SERPL W P-5'-P-CCNC: 30 U/L (ref 16–61)
ANION GAP SERPL CALCULATED.3IONS-SCNC: 10 MMOL/L (ref 7–16)
AST SERPL W P-5'-P-CCNC: 15 U/L (ref 15–37)
BASOPHILS # BLD AUTO: 0.04 K/UL (ref 0–0.2)
BASOPHILS NFR BLD AUTO: 0.7 % (ref 0–1)
BILIRUB SERPL-MCNC: 0.5 MG/DL (ref ?–1.2)
BUN SERPL-MCNC: 20 MG/DL (ref 7–18)
BUN/CREAT SERPL: 18 (ref 6–20)
CALCIUM SERPL-MCNC: 8.6 MG/DL (ref 8.5–10.1)
CHLORIDE SERPL-SCNC: 106 MMOL/L (ref 98–107)
CHOLEST SERPL-MCNC: 211 MG/DL (ref 0–200)
CHOLEST/HDLC SERPL: 3.8 {RATIO}
CO2 SERPL-SCNC: 24 MMOL/L (ref 21–32)
CREAT SERPL-MCNC: 1.14 MG/DL (ref 0.7–1.3)
DIFFERENTIAL METHOD BLD: ABNORMAL
EGFR (NO RACE VARIABLE) (RUSH/TITUS): 74 ML/MIN/1.73M2
EOSINOPHIL # BLD AUTO: 0.14 K/UL (ref 0–0.5)
EOSINOPHIL NFR BLD AUTO: 2.6 % (ref 1–4)
ERYTHROCYTE [DISTWIDTH] IN BLOOD BY AUTOMATED COUNT: 13.3 % (ref 11.5–14.5)
EST. AVERAGE GLUCOSE BLD GHB EST-MCNC: 157 MG/DL
GLOBULIN SER-MCNC: 3.5 G/DL (ref 2–4)
GLUCOSE SERPL-MCNC: 83 MG/DL (ref 74–106)
HBA1C MFR BLD HPLC: 7.1 % (ref 4.5–6.6)
HCT VFR BLD AUTO: 45.3 % (ref 40–54)
HCV AB SER QL: NORMAL
HDLC SERPL-MCNC: 56 MG/DL (ref 40–60)
HGB BLD-MCNC: 14.3 G/DL (ref 13.5–18)
HIV 1+O+2 AB SERPL QL: NORMAL
IMM GRANULOCYTES # BLD AUTO: 0.04 K/UL (ref 0–0.04)
IMM GRANULOCYTES NFR BLD: 0.7 % (ref 0–0.4)
LDLC SERPL CALC-MCNC: 136 MG/DL
LDLC/HDLC SERPL: 2.4 {RATIO}
LYMPHOCYTES # BLD AUTO: 1.94 K/UL (ref 1–4.8)
LYMPHOCYTES NFR BLD AUTO: 35.4 % (ref 27–41)
MCH RBC QN AUTO: 28.8 PG (ref 27–31)
MCHC RBC AUTO-ENTMCNC: 31.6 G/DL (ref 32–36)
MCV RBC AUTO: 91.1 FL (ref 80–96)
MONOCYTES # BLD AUTO: 0.53 K/UL (ref 0–0.8)
MONOCYTES NFR BLD AUTO: 9.7 % (ref 2–6)
MPC BLD CALC-MCNC: 10.8 FL (ref 9.4–12.4)
NEUTROPHILS # BLD AUTO: 2.79 K/UL (ref 1.8–7.7)
NEUTROPHILS NFR BLD AUTO: 50.9 % (ref 53–65)
NONHDLC SERPL-MCNC: 155 MG/DL
NRBC # BLD AUTO: 0 X10E3/UL
NRBC, AUTO (.00): 0 %
PLATELET # BLD AUTO: 204 K/UL (ref 150–400)
POTASSIUM SERPL-SCNC: 3.9 MMOL/L (ref 3.5–5.1)
PROT SERPL-MCNC: 7.3 G/DL (ref 6.4–8.2)
PSA SERPL-MCNC: 1.42 NG/ML
RBC # BLD AUTO: 4.97 M/UL (ref 4.6–6.2)
SODIUM SERPL-SCNC: 136 MMOL/L (ref 136–145)
TRIGL SERPL-MCNC: 96 MG/DL (ref 35–150)
VLDLC SERPL-MCNC: 19 MG/DL
WBC # BLD AUTO: 5.48 K/UL (ref 4.5–11)

## 2024-09-11 PROCEDURE — 83036 HEMOGLOBIN GLYCOSYLATED A1C: CPT | Mod: ,,, | Performed by: CLINICAL MEDICAL LABORATORY

## 2024-09-11 PROCEDURE — 80061 LIPID PANEL: CPT | Mod: ,,, | Performed by: CLINICAL MEDICAL LABORATORY

## 2024-09-11 PROCEDURE — G0103 PSA SCREENING: HCPCS | Mod: ,,, | Performed by: CLINICAL MEDICAL LABORATORY

## 2024-09-11 PROCEDURE — 82043 UR ALBUMIN QUANTITATIVE: CPT | Mod: ,,, | Performed by: CLINICAL MEDICAL LABORATORY

## 2024-09-11 PROCEDURE — 82570 ASSAY OF URINE CREATININE: CPT | Mod: ,,, | Performed by: CLINICAL MEDICAL LABORATORY

## 2024-09-11 PROCEDURE — 86803 HEPATITIS C AB TEST: CPT | Mod: ,,, | Performed by: CLINICAL MEDICAL LABORATORY

## 2024-09-11 PROCEDURE — 99214 OFFICE O/P EST MOD 30 MIN: CPT | Mod: ,,, | Performed by: STUDENT IN AN ORGANIZED HEALTH CARE EDUCATION/TRAINING PROGRAM

## 2024-09-11 PROCEDURE — 80053 COMPREHEN METABOLIC PANEL: CPT | Mod: ,,, | Performed by: CLINICAL MEDICAL LABORATORY

## 2024-09-11 PROCEDURE — 85025 COMPLETE CBC W/AUTO DIFF WBC: CPT | Mod: ,,, | Performed by: CLINICAL MEDICAL LABORATORY

## 2024-09-11 PROCEDURE — 87389 HIV-1 AG W/HIV-1&-2 AB AG IA: CPT | Mod: ,,, | Performed by: CLINICAL MEDICAL LABORATORY

## 2024-09-11 RX ORDER — TAMSULOSIN HYDROCHLORIDE 0.4 MG/1
0.4 CAPSULE ORAL DAILY
Qty: 90 CAPSULE | Refills: 1 | Status: SHIPPED | OUTPATIENT
Start: 2024-09-11

## 2024-09-11 RX ORDER — TAMSULOSIN HYDROCHLORIDE 0.4 MG/1
0.4 CAPSULE ORAL DAILY
Qty: 30 CAPSULE | Refills: 0 | Status: SHIPPED | OUTPATIENT
Start: 2024-09-11 | End: 2024-09-11 | Stop reason: SDUPTHER

## 2024-09-11 RX ORDER — ATORVASTATIN CALCIUM 40 MG/1
40 TABLET, FILM COATED ORAL NIGHTLY
Qty: 90 TABLET | Refills: 1 | Status: SHIPPED | OUTPATIENT
Start: 2024-09-11

## 2024-09-11 RX ORDER — METFORMIN HYDROCHLORIDE 1000 MG/1
1000 TABLET ORAL 2 TIMES DAILY WITH MEALS
Qty: 180 TABLET | Refills: 1 | Status: SHIPPED | OUTPATIENT
Start: 2024-09-11

## 2024-09-11 NOTE — PROGRESS NOTES
Progress Note     VICKIE ALEJANDRE MD   [address]  [place] [address]     PATIENT NAME: Eliazar Bermudez  : 1965  DATE: 24  MRN: [id number]      Billing Provider: VICKIE ALEJANDRE MD  Level of Service: [place] OFFICE/OUTPT VISIT, EST, LEVL IV, 30-39 MIN  Patient PCP Information       Provider PCP Type    VICKIE ALEJANDRE MD General                Medication Refill (Needs refills, but has stopped taking some things due to feeling like he was on too much medications. ) and Health Maintenance (Discussed care gaps with pt; has had eye exam but does not remember where, will get some labs today . )      SUBJECTIVE:     Eliazar Bermudez is a 59 y.o.male who presents to clinic for Medication Refill (Needs refills, but has stopped taking some things due to feeling like he was on too much medications. ) and Health Maintenance (Discussed care gaps with pt; has had eye exam but does not remember where, will get some labs today . )    Patient presents to clinic today for follow up.    Patient has a history of hyperlipidemia for which he takes Lipitor.  He tolerates this medication without difficulty.    Patient also has a history of type 2 diabetes for which he takes metformin and Jardiance.  Patient was not taking Jardiance daily.  But states he will start.  Had his eye exam performed at [facility].    Patient has a history of BPH for which he takes Flomax.  Symptoms are currently well-controlled.      Patient was history of hypertension for which he it is not currently taking any medication.  Blood pressure is well-controlled.    Has a history of migraines and takes Zomig and Qulipta.  He is seeing Neurology, Dr. Silver.    [facility]. Normal. Repeat in 3 years    Shingles: Due  [facility]: [facility].     All other pertinent review of systems negative. Please see HPI for details.     Past Medical History:  has a past medical history of Chronic migraine  without aura, History of closed head injury (08/08/2018), Joint pain, Knee pain, right, Migraines, and Open fracture of distal phalanx of finger (11/15/2021).   Past Surgical History:  has a past surgical history that includes Total knee arthroplasty; Knee surgery; Knee arthroscopy; [place] tooth extraction; Fracture surgery (05/25/2009); and Joint replacement (09/22/2012).  Family History: family history includes Arthritis in his maternal grandmother and mother; COPD in his brother and mother; Diabetes in his maternal grandmother; Heart attack in his maternal grandfather.  Social History:  reports that he has never smoked. He has never been exposed to tobacco smoke. He has never used smokeless tobacco. He reports current alcohol use. He reports that he does not use drugs.  Allergies: Review of patient's allergies indicates:  No Known Allergies      Current Outpatient Medications:     amitriptyline (ELAVIL) 100 MG tablet, amitriptyline 100 mg tablet, Disp: , Rfl:     blood sugar diagnostic Strp, 1 strip by Misc.(Non-Drug; Combo Route) route 2 (two) times a day., Disp: 70 each, Rfl: 11    clonazePAM (KLONOPIN) 0.5 MG tablet, Take 1 mg by mouth 2 (two) times daily., Disp: , Rfl:     lancets (LANCETS,THIN) Misc, 1 each by Misc.(Non-Drug; Combo Route) route 2 (two) times a day., Disp: 100 each, Rfl: 11    melatonin 3 mg Cap, Take 1 capsule by mouth every evening., Disp: , Rfl:     QULIPTA 60 mg Tab, Take 1 tablet by mouth., Disp: , Rfl:     VITAMIN D3 50 mcg (2,000 unit) tablet, Take 1 tablet by mouth Daily., Disp: , Rfl:     ZOLMitriptan (ZOMIG) 5 MG tablet, Take 5 mg by mouth as needed for Migraine., Disp: , Rfl:     atorvastatin (LIPITOR) 40 MG tablet, Take 1 tablet (40 mg total) by mouth every evening., Disp: 90 tablet, Rfl: 1    blood-glucose meter kit, Use as instructed (Patient not taking: Reported on 9/6/2023), Disp: 1 each, Rfl: 0    empagliflozin (JARDIANCE) 10 mg tablet, Take 1 tablet (10 mg total) by mouth  "once daily., Disp: 90 tablet, Rfl: 1    metFORMIN (GLUCOPHAGE) 1000 MG tablet, Take 1 tablet (1,000 mg total) by mouth 2 (two) times daily with meals., Disp: 180 tablet, Rfl: 1    tamsulosin (FLOMAX) 0.4 mg Cap, Take 1 capsule (0.4 mg total) by mouth once daily., Disp: 90 capsule, Rfl: 1    triamcinolone acetonide 0.1% (KENALOG) 0.1 % cream, Apply topically 2 (two) times daily. (Patient not taking: Reported on 9/11/2024), Disp: 15 g, Rfl: 0   OBJECTIVE:     Vital Signs   /80 (BP Location: Left arm, Patient Position: Sitting, BP Method: Large (Manual))   Pulse 86   Temp 98.1 °F (36.7 °C)   Resp 18   Ht 5' 7" (1.702 m)   Wt 93 kg (205 lb)   SpO2 97%   BMI 32.11 kg/m²     Physical Exam  Constitutional:       General: He is not in acute distress.     Appearance: Normal appearance. He is not ill-appearing.   HENT:      Head: Normocephalic and atraumatic.   Eyes:      Extraocular Movements: Extraocular movements intact.      Pupils: Pupils are equal, round, and reactive to light.   Cardiovascular:      Rate and Rhythm: Normal rate and regular rhythm.      Pulses:           Dorsalis pedis pulses are 2+ on the right side and 2+ on the left side.        Posterior tibial pulses are 2+ on the right side and 2+ on the left side.      Heart sounds: No murmur heard.     No friction rub. No gallop.   Pulmonary:      Effort: Pulmonary effort is normal. No respiratory distress.      Breath sounds: Normal breath sounds. No wheezing, rhonchi or rales.   Abdominal:      Palpations: Abdomen is soft.      Tenderness: There is no abdominal tenderness. There is no guarding or rebound.   Musculoskeletal:         General: Normal range of motion.      Right foot: Normal range of motion.      Left foot: Normal range of motion.   Feet:      Right foot:      Protective Sensation: 10 sites tested.  10 sites sensed.      Skin integrity: Skin integrity normal.      Toenail Condition: Right toenails are abnormally thick.      Left foot: "      Protective Sensation: 10 sites tested.  10 sites sensed.      Skin integrity: Skin integrity normal.      Toenail Condition: Left toenails are normal.   Skin:     General: Skin is warm and dry.      Capillary Refill: Capillary refill takes less than 2 seconds.   Neurological:      General: No focal deficit present.      Mental Status: He is alert.   Psychiatric:         Mood and Affect: Mood normal.         Behavior: Behavior normal.         ASSESSMENT/PLAN:     1. Benign prostatic hyperplasia with urinary hesitancy  -     Discontinue: tamsulosin (FLOMAX) 0.4 mg Cap; Take 1 capsule (0.4 mg total) by mouth once daily.  Dispense: 30 capsule; Refill: 0  -     tamsulosin (FLOMAX) 0.4 mg Cap; Take 1 capsule (0.4 mg total) by mouth once daily.  Dispense: 90 capsule; Refill: 1  Currently well-controlled with Flomax.  Patient to continue.  Refills provided.    2. Mixed hyperlipidemia  -     Lipid Panel; Future; Expected date: 09/11/2024  -     Comprehensive Metabolic Panel; Future; Expected date: 09/11/2024  Patient was history of hyperlipidemia.  Obtaining lipid panel.  CMP ordered as well.  Patient to continue atorvastatin.  Refill provided.      3. Primary hypertension  -     Comprehensive Metabolic Panel; Future; Expected date: 09/11/2024  -     CBC Auto Differential; Future; Expected date: 09/11/2024  Patient with a history of hypertension but we will pressure currently well-controlled without medication.  We will continue to monitor.  Routine blood work ordered.      4. Type 2 diabetes mellitus with hyperglycemia, without long-term current use of insulin  -     Hemoglobin A1C; Future; Expected date: 09/11/2024  -     Microalbumin/Creatinine Ratio, Urine  -     Comprehensive Metabolic Panel; Future; Expected date: 09/11/2024  -     CBC Auto Differential; Future; Expected date: 09/11/2024  -     metFORMIN (GLUCOPHAGE) 1000 MG tablet; Take 1 tablet (1,000 mg total) by mouth 2 (two) times daily with meals.  Dispense:  180 tablet; Refill: 1  -     empagliflozin (JARDIANCE) 10 mg tablet; Take 1 tablet (10 mg total) by mouth once daily.  Dispense: 90 tablet; Refill: 1  Patient due for hemoglobin A1c and urine microalbumin creatinine ratio.  Those tests were ordered.  Patient to continue metformin and Jardiance.  Counseled on importance of taking Jardiance daily.    5. Screening for malignant neoplasm of prostate  -     PSA, Screening; Future; Expected date: 09/11/2024  Patient due for PSA.  We will order.    6. Screening for HIV (human immunodeficiency virus)  -     HIV 1/2 Ag/Ab (4th Gen); Future; Expected date: 09/11/2024  Patient due for HIV screening.  Patient amenable.  HIV screening ordered.    7. Need for hepatitis C screening test  -     Hepatitis C Antibody; Future; Expected date: 09/11/2024  Patient due for Hepatitis-C screening.  Patient amenable. Hepatitis-C screening ordered.    Other orders  -     atorvastatin (LIPITOR) 40 MG tablet; Take 1 tablet (40 mg total) by mouth every evening.  Dispense: 90 tablet; Refill: 1        Follow up in about 6 months (around 3/11/2025).      VICKIE ALEJANDRE MD  09/11/2024    Due to voice recognition software, sound alike and misspelled words may be contained in the documentation.

## 2024-09-12 LAB
CREAT UR-MCNC: 128 MG/DL (ref 39–259)
MICROALBUMIN UR-MCNC: 0.6 MG/DL (ref 0–2.8)
MICROALBUMIN/CREAT RATIO PNL UR: 4.7 MG/G (ref 0–30)

## 2024-09-12 NOTE — PROGRESS NOTES
Please let patient know that his hemoglobin A1c is trending down and is now 7.1. However, his goal is <7. Taking the Jardiance daily will help get him to goal. We can FU in 6 months. His cholesterol is elevated but better from previous. He will need to continue his cholesterol medication. His electrolytes, kidney function, and liver enzymes are normal. His blood count is normal. His HIV and Hepatitis C screens are negative. His prostate level is normal.

## 2024-09-16 DIAGNOSIS — N40.1 BENIGN PROSTATIC HYPERPLASIA WITH URINARY HESITANCY: ICD-10-CM

## 2024-09-16 DIAGNOSIS — R39.11 BENIGN PROSTATIC HYPERPLASIA WITH URINARY HESITANCY: ICD-10-CM

## 2024-09-16 RX ORDER — TAMSULOSIN HYDROCHLORIDE 0.4 MG/1
0.4 CAPSULE ORAL DAILY
Qty: 30 CAPSULE | Refills: 0 | Status: SHIPPED | OUTPATIENT
Start: 2024-09-16

## 2024-09-19 ENCOUNTER — TELEPHONE (OUTPATIENT)
Dept: FAMILY MEDICINE | Facility: CLINIC | Age: 59
End: 2024-09-19
Payer: OTHER GOVERNMENT

## 2024-09-19 DIAGNOSIS — R21 RASH: Primary | ICD-10-CM

## 2024-09-19 NOTE — TELEPHONE ENCOUNTER
----- Message from Carli Barriga sent at 9/19/2024  2:53 PM CDT -----  Regarding: referral  Asking for a referral for a rash around neck and shoulder also discoloration around temple area on head     Ask for [facility] in [place] because he has      He was here September 11th

## 2024-09-24 ENCOUNTER — PATIENT OUTREACH (OUTPATIENT)
Facility: HOSPITAL | Age: 59
End: 2024-09-24
Payer: OTHER GOVERNMENT

## 2024-09-24 NOTE — PROGRESS NOTES
Pt stated [facility], but nothing is shown in Care Everywhere or [employer] showing he had it completed. Provider has been notified  Requesting eye exam from [facility]

## 2024-09-24 NOTE — LETTER
AUTHORIZATION FOR RELEASE OF   CONFIDENTIAL INFORMATION    Dear [facility],    We are seeing Eliazar Bermudez, date of birth 1965, in the clinic at [facility]. Che Pardo MD is the patient's PCP. Eliazar Bermudez has an outstanding lab/procedure at the time we reviewed his chart. In order to help keep his health information updated, he has authorized us to request the following medical record(s):        (  )  MAMMOGRAM                                      (  )  COLONOSCOPY      (  )  PAP SMEAR                                          (  )  OUTSIDE LAB RESULTS     (  )  DEXA SCAN                                          (X)  RECENT EYE EXAM            (  )  FOOT EXAM                                          (  )  ENTIRE RECORD     (  )  OUTSIDE IMMUNIZATIONS                 (  )  _______________         Please fax records to [facility]inator, Keya Dickerson, [phone].     If you have any questions, please contact [phone].          Patient Name: Eliazar Bermudez  : 1965  Patient Phone #: [phone]

## 2024-09-25 ENCOUNTER — TELEPHONE (OUTPATIENT)
Dept: FAMILY MEDICINE | Facility: CLINIC | Age: 59
End: 2024-09-25
Payer: OTHER GOVERNMENT

## 2024-09-25 NOTE — TELEPHONE ENCOUNTER
Contacted pt to discuss care gaps. Verbalized understanding. Stated that he did have eye exam in [place] with Dr. Smith Palacios. Denies all other care gaps at this time.     Health Maintenance Due   Topic Date Due    Pneumococcal Vaccines (Age 0-64) (1 of 2 - PCV) Never done    Eye Exam  Never done    Colorectal Cancer Screening  Never done    Shingles Vaccine (1 of 2) Never done    TETANUS VACCINE  12/01/2021    RSV Vaccine (Age 60+ and Pregnant patients) (1 - 1-dose 60+ series) Never done    COVID-19 Vaccine (4 - 2023-24 season) 09/01/2024

## 2024-10-08 ENCOUNTER — PATIENT OUTREACH (OUTPATIENT)
Facility: HOSPITAL | Age: 59
End: 2024-10-08
Payer: OTHER GOVERNMENT

## 2024-10-08 NOTE — PROGRESS NOTES
Uploaded eye exam from [facility], repeat in 1 year

## 2024-11-20 ENCOUNTER — PATIENT MESSAGE (OUTPATIENT)
Dept: ADMINISTRATIVE | Facility: HOSPITAL | Age: 59
End: 2024-11-20

## 2024-11-21 DIAGNOSIS — R39.11 BENIGN PROSTATIC HYPERPLASIA WITH URINARY HESITANCY: ICD-10-CM

## 2024-11-21 DIAGNOSIS — N40.1 BENIGN PROSTATIC HYPERPLASIA WITH URINARY HESITANCY: ICD-10-CM

## 2024-11-25 RX ORDER — TAMSULOSIN HYDROCHLORIDE 0.4 MG/1
1 CAPSULE ORAL
Qty: 90 CAPSULE | Refills: 0 | Status: SHIPPED | OUTPATIENT
Start: 2024-11-25

## 2024-12-13 DIAGNOSIS — N40.1 BENIGN PROSTATIC HYPERPLASIA WITH URINARY HESITANCY: ICD-10-CM

## 2024-12-13 DIAGNOSIS — R39.11 BENIGN PROSTATIC HYPERPLASIA WITH URINARY HESITANCY: ICD-10-CM

## 2024-12-13 RX ORDER — TAMSULOSIN HYDROCHLORIDE 0.4 MG/1
1 CAPSULE ORAL DAILY
Qty: 90 CAPSULE | Refills: 0 | Status: SHIPPED | OUTPATIENT
Start: 2024-12-13

## 2025-01-25 ENCOUNTER — PATIENT MESSAGE (OUTPATIENT)
Dept: ADMINISTRATIVE | Facility: HOSPITAL | Age: 60
End: 2025-01-25

## 2025-01-26 DIAGNOSIS — Z12.11 SCREENING FOR COLON CANCER: ICD-10-CM

## 2025-01-27 DIAGNOSIS — E11.65 TYPE 2 DIABETES MELLITUS WITH HYPERGLYCEMIA, WITHOUT LONG-TERM CURRENT USE OF INSULIN: Primary | ICD-10-CM

## 2025-01-27 NOTE — TELEPHONE ENCOUNTER
Pt called and needed a new updated [facility] approval for a eye exam to [facility] sotero hagen

## 2025-01-28 LAB
LEFT EYE DM RETINOPATHY: NEGATIVE
RIGHT EYE DM RETINOPATHY: NEGATIVE

## 2025-02-21 DIAGNOSIS — N40.1 BENIGN PROSTATIC HYPERPLASIA WITH URINARY HESITANCY: ICD-10-CM

## 2025-02-21 DIAGNOSIS — R39.11 BENIGN PROSTATIC HYPERPLASIA WITH URINARY HESITANCY: ICD-10-CM

## 2025-02-21 RX ORDER — TAMSULOSIN HYDROCHLORIDE 0.4 MG/1
0.4 CAPSULE ORAL DAILY
Qty: 90 CAPSULE | Refills: 1 | Status: SHIPPED | OUTPATIENT
Start: 2025-02-21

## 2025-03-12 ENCOUNTER — OFFICE VISIT (OUTPATIENT)
Dept: FAMILY MEDICINE | Facility: CLINIC | Age: 60
End: 2025-03-12
Payer: OTHER GOVERNMENT

## 2025-03-12 ENCOUNTER — PATIENT OUTREACH (OUTPATIENT)
Facility: HOSPITAL | Age: 60
End: 2025-03-12
Payer: OTHER GOVERNMENT

## 2025-03-12 VITALS
RESPIRATION RATE: 18 BRPM | HEART RATE: 90 BPM | BODY MASS INDEX: 32.12 KG/M2 | DIASTOLIC BLOOD PRESSURE: 80 MMHG | OXYGEN SATURATION: 97 % | TEMPERATURE: 97 F | SYSTOLIC BLOOD PRESSURE: 124 MMHG | HEIGHT: 67 IN | WEIGHT: 204.63 LBS

## 2025-03-12 DIAGNOSIS — E78.2 MIXED HYPERLIPIDEMIA: ICD-10-CM

## 2025-03-12 DIAGNOSIS — E11.65 TYPE 2 DIABETES MELLITUS WITH HYPERGLYCEMIA, WITHOUT LONG-TERM CURRENT USE OF INSULIN: Primary | ICD-10-CM

## 2025-03-12 DIAGNOSIS — I10 PRIMARY HYPERTENSION: ICD-10-CM

## 2025-03-12 DIAGNOSIS — R20.9 ALTERATION IN SKIN SENSATION: ICD-10-CM

## 2025-03-12 DIAGNOSIS — Z12.11 SCREENING FOR COLON CANCER: ICD-10-CM

## 2025-03-12 DIAGNOSIS — E55.9 VITAMIN D DEFICIENCY: ICD-10-CM

## 2025-03-12 LAB
25(OH)D3 SERPL-MCNC: 84.2 NG/ML (ref 30–80)
ANION GAP SERPL CALCULATED.3IONS-SCNC: 14 MMOL/L (ref 7–16)
BUN SERPL-MCNC: 16 MG/DL (ref 8–26)
BUN/CREAT SERPL: 17 (ref 6–20)
CALCIUM SERPL-MCNC: 8.8 MG/DL (ref 8.8–10)
CHLORIDE SERPL-SCNC: 103 MMOL/L (ref 98–107)
CO2 SERPL-SCNC: 24 MMOL/L (ref 23–31)
CREAT SERPL-MCNC: 0.93 MG/DL (ref 0.72–1.25)
EGFR (NO RACE VARIABLE) (RUSH/TITUS): 94 ML/MIN/1.73M2
EST. AVERAGE GLUCOSE BLD GHB EST-MCNC: 134 MG/DL
GLUCOSE SERPL-MCNC: 77 MG/DL (ref 82–115)
HBA1C MFR BLD HPLC: 6.3 %
POTASSIUM SERPL-SCNC: 4 MMOL/L (ref 3.5–5.1)
SODIUM SERPL-SCNC: 137 MMOL/L (ref 136–145)
VIT B12 SERPL-MCNC: 1332 PG/ML (ref 213–816)

## 2025-03-12 PROCEDURE — 80048 BASIC METABOLIC PNL TOTAL CA: CPT | Mod: ,,, | Performed by: CLINICAL MEDICAL LABORATORY

## 2025-03-12 PROCEDURE — 82306 VITAMIN D 25 HYDROXY: CPT | Mod: ,,, | Performed by: CLINICAL MEDICAL LABORATORY

## 2025-03-12 PROCEDURE — 82607 VITAMIN B-12: CPT | Mod: ,,, | Performed by: CLINICAL MEDICAL LABORATORY

## 2025-03-12 PROCEDURE — 99214 OFFICE O/P EST MOD 30 MIN: CPT | Mod: ,,, | Performed by: STUDENT IN AN ORGANIZED HEALTH CARE EDUCATION/TRAINING PROGRAM

## 2025-03-12 PROCEDURE — 83036 HEMOGLOBIN GLYCOSYLATED A1C: CPT | Mod: ,,, | Performed by: CLINICAL MEDICAL LABORATORY

## 2025-03-12 RX ORDER — KETOCONAZOLE 20 MG/ML
SHAMPOO, SUSPENSION TOPICAL
COMMUNITY
Start: 2024-10-30 | End: 2025-03-12

## 2025-03-12 RX ORDER — LANOLIN ALCOHOL/MO/W.PET/CERES
1000 CREAM (GRAM) TOPICAL
COMMUNITY
Start: 2025-01-24

## 2025-03-12 RX ORDER — FOLIC ACID 1 MG/1
1000 TABLET ORAL
COMMUNITY
Start: 2024-12-13

## 2025-03-12 RX ORDER — METFORMIN HYDROCHLORIDE 500 MG/1
500 TABLET, EXTENDED RELEASE ORAL
Qty: 90 TABLET | Refills: 1 | Status: SHIPPED | OUTPATIENT
Start: 2025-03-12 | End: 2026-03-12

## 2025-03-12 RX ORDER — NAPROXEN 250 MG/1
250 TABLET ORAL
COMMUNITY
Start: 2025-01-24

## 2025-03-12 RX ORDER — METFORMIN HYDROCHLORIDE 1000 MG/1
1000 TABLET ORAL 2 TIMES DAILY WITH MEALS
Qty: 180 TABLET | Refills: 1 | Status: CANCELLED | OUTPATIENT
Start: 2025-03-12

## 2025-03-12 RX ORDER — ATORVASTATIN CALCIUM 40 MG/1
40 TABLET, FILM COATED ORAL NIGHTLY
Qty: 90 TABLET | Refills: 1 | Status: SHIPPED | OUTPATIENT
Start: 2025-03-12

## 2025-03-12 RX ORDER — SILDENAFIL 50 MG/1
50 TABLET, FILM COATED ORAL
COMMUNITY
Start: 2025-01-24

## 2025-03-12 NOTE — LETTER
AUTHORIZATION FOR RELEASE OF   CONFIDENTIAL INFORMATION    Dear [facility],    We are seeing Eliazar Bermudez, date of birth 1965, in the clinic at [facility]. Che Vaughan MD is the patient's PCP. Eliazar Bermudez has an outstanding lab/procedure at the time we reviewed his chart. In order to help keep his health information updated, he has authorized us to request the following medical record(s):        (  )  MAMMOGRAM                                      (  )  COLONOSCOPY      (  )  PAP SMEAR                                          (  )  OUTSIDE LAB RESULTS     (  )  DEXA SCAN                                          (X) RECENT EYE EXAM            (  )  FOOT EXAM                                          (  )  ENTIRE RECORD     (  )  OUTSIDE IMMUNIZATIONS                 (  )  _______________         Please fax records to [facility], Keya Dickerson, [phone].     If you have any questions, please contact [phone].           Patient Name: Eliazar Bermudez  : 1965  Patient Phone #: [phone]

## 2025-03-12 NOTE — PROGRESS NOTES
03/12/2025   --Chart accessed for: Care Gaps  Next appointment 3/12/2025 . Appointment notes updated to include: due for colonoscopy, diabetic eye exam, and A1c   Health Maintenance Due   Topic Date Due    Pneumococcal Vaccines (Age 50+) (1 of 2 - PCV) Never done    Colorectal Cancer Screening  Never done    Shingles Vaccine (1 of 2) Never done    TETANUS VACCINE  12/01/2021    RSV Vaccine (Age 60+ and Pregnant patients) (1 - Risk 60-74 years 1-dose series) Never done    COVID-19 Vaccine (4 - 2024-25 season) 09/01/2024    Diabetic Eye Exam  02/05/2025    Hemoglobin A1c  03/11/2025

## 2025-03-12 NOTE — PROGRESS NOTES
Progress Note     VICKIE QUIROZ MD   [address]  [place] [address]     PATIENT NAME: Eliazar Bermudez  : 1965  DATE: 3/12/25  MRN: [id number]      Billing Provider: VICKIE QUIROZ MD  Level of Service: [place] OFFICE/OUTPT VISIT, EST, LEVL IV, 30-39 MIN  Patient PCP Information       Provider PCP Type    VICKIE QIUROZ MD General                Follow-up (6 month fu./Not fasting.) and Diabetes (Has concerns about his metformin, interested in changing to ER.)      SUBJECTIVE:     Eliazar Bermudez is a 59 y.o.male who presents to clinic for Follow-up (6 month fu./Not fasting.) and Diabetes (Has concerns about his metformin, interested in changing to ER.)    Patient presents to clinic today for six-month follow up.    Patient has a history of diabetes for which he is taking metformin and Jardiance.  He has been holding his Jardiance for the past 7 days and wanted to talk about whether or not he needed to restart this medication.  Notes his metformin does cause some GI upset and he would like to transitioned to the extended release version.  He is up-to-date on his eye exam.  Records have been requested.    Patient has prior diagnosis of benign neoplasm of the colon in his chart but he states he has never had a colonoscopy or ever been screened for colon cancer in is not sure what this diagnosis came from.  He would like to have it removed.  He denies any family history of colon cancer.  He denies any personal history of polyps.  He would like a Cologuard for colon cancer screening.    Patient also has a history of vitamin D deficiency.  Patient due for repeat vitamin-D level.    Patient also reports having some changes in his skin sensation of his legs.  He describes it as skin prickling in his lower legs.  He does take vitamin B12.  He does has a history of brain injury he has normal sensation in his feet.      All other pertinent review of systems negative.  "Please see HPI for details.     Past Medical History:  has a past medical history of Chronic migraine without aura, History of closed head injury (08/08/2018), Joint pain, Knee pain, right, Migraines, and Open fracture of distal phalanx of finger (11/15/2021).   Past Surgical History:  has a past surgical history that includes Total knee arthroplasty; Knee surgery; Knee arthroscopy; [place] tooth extraction; Fracture surgery (05/25/2009); and Joint replacement (09/22/2012).  Family History: family history includes Arthritis in his maternal grandmother and mother; COPD in his brother and mother; Diabetes in his maternal grandmother; Heart attack in his maternal grandfather.  Social History:  reports that he has never smoked. He has never been exposed to tobacco smoke. He has never used smokeless tobacco. He reports current alcohol use. He reports that he does not use drugs.  Allergies: Review of patient's allergies indicates:  No Known Allergies    Current Medications[1]   OBJECTIVE:     Vital Signs   /80   Pulse 90   Temp 97.3 °F (36.3 °C)   Resp 18   Ht 5' 7" (1.702 m)   Wt 92.8 kg (204 lb 9.6 oz)   SpO2 97%   BMI 32.04 kg/m²     Physical Exam  Constitutional:       General: He is not in acute distress.     Appearance: Normal appearance. He is not ill-appearing.   HENT:      Head: Normocephalic and atraumatic.   Eyes:      Extraocular Movements: Extraocular movements intact.      Pupils: Pupils are equal, round, and reactive to light.   Cardiovascular:      Rate and Rhythm: Normal rate and regular rhythm.      Heart sounds: No murmur heard.     No friction rub. No gallop.   Pulmonary:      Effort: Pulmonary effort is normal. No respiratory distress.      Breath sounds: Normal breath sounds. No wheezing, rhonchi or rales.   Abdominal:      Palpations: Abdomen is soft.      Tenderness: There is no abdominal tenderness. There is no guarding or rebound.   Musculoskeletal:         General: Normal range of " motion.   Skin:     General: Skin is warm and dry.      Capillary Refill: Capillary refill takes less than 2 seconds.   Neurological:      General: No focal deficit present.      Mental Status: He is alert.   Psychiatric:         Mood and Affect: Mood normal.         Behavior: Behavior normal.         ASSESSMENT/PLAN:     1. Type 2 diabetes mellitus with hyperglycemia, without long-term current use of insulin  -     Hemoglobin A1C; Future; Expected date: 03/12/2025  -     empagliflozin (JARDIANCE) 10 mg tablet; Take 1 tablet (10 mg total) by mouth once daily.  Dispense: 90 tablet; Refill: 1  -     Basic Metabolic Panel; Future; Expected date: 03/12/2025  -     metFORMIN (GLUCOPHAGE-XR) 500 MG ER 24hr tablet; Take 1 tablet (500 mg total) by mouth daily with breakfast.  Dispense: 90 tablet; Refill: 1  Patient with a history of diabetes.  Hemoglobin A1c previously well-controlled.  Patient due for repeat.  Hemoglobin A1c ordered.  Patient to continue Jardiance.  Patient requesting to transitioned to metformin extended release.  Order placed.  Patient to monitor blood glucose levels at home.  We will adjust if needed.    2. Mixed hyperlipidemia  -     atorvastatin (LIPITOR) 40 MG tablet; Take 1 tablet (40 mg total) by mouth every evening.  Dispense: 90 tablet; Refill: 1  Patient with a history of hyperlipidemia.  Patient to continue Lipitor.  Patient up-to-date on lipid panel.  We will repeat at six-month follow up.    3. Primary hypertension  -     Basic Metabolic Panel; Future; Expected date: 03/12/2025  Patient with a prior history of hypertension.  Obtaining BMP to monitor electrolytes and kidney function.  Blood pressure currently well-controlled.      4. Vitamin D deficiency  -     Vitamin D; Future; Expected date: 03/12/2025  Patient with a history of vitamin-D deficiency.  Obtaining vitamin-D level.    5. Alteration in skin sensation  -     Basic Metabolic Panel; Future; Expected date: 03/12/2025  -     Vitamin  B12; Future; Expected date: 03/12/2025  Patient with some alteration in skin sensation.  Patient does have history of diabetes which is possibly contributing.  Repeat hemoglobin A1c today to monitor. Obtaining vitamin B12 and BMP.      6. Screening for colon cancer  Overview:  Denies family history of colon cancer.  Denies personal history of polyps.    Orders:  -     Cologuard Screening (Multitarget Stool DNA); Future; Expected date: 03/12/2025        Follow up in about 6 months (around 9/12/2025).      VICKIE QUIROZ MD  03/12/2025    Due to voice recognition software, sound alike and misspelled words may be contained in the documentation.            [1]   Current Outpatient Medications:     amitriptyline (ELAVIL) 100 MG tablet, amitriptyline 100 mg tablet, Disp: , Rfl:     blood sugar diagnostic Strp, 1 strip by Misc.(Non-Drug; Combo Route) route 2 (two) times a day., Disp: 70 each, Rfl: 11    blood-glucose meter kit, Use as instructed, Disp: 1 each, Rfl: 0    clonazePAM (KLONOPIN) 0.5 MG tablet, Take 1 mg by mouth 2 (two) times daily., Disp: , Rfl:     cyanocobalamin (VITAMIN B-12) 1000 MCG tablet, Take 1,000 mcg by mouth., Disp: , Rfl:     folic acid (FOLVITE) 1 MG tablet, Take 1,000 mcg by mouth., Disp: , Rfl:     lancets (LANCETS,THIN) Misc, 1 each by Misc.(Non-Drug; Combo Route) route 2 (two) times a day., Disp: 100 each, Rfl: 11    melatonin 3 mg Cap, Take 1 capsule by mouth every evening., Disp: , Rfl:     QULIPTA 60 mg Tab, Take 1 tablet by mouth., Disp: , Rfl:     sildenafiL (VIAGRA) 50 MG tablet, Take 50 mg by mouth., Disp: , Rfl:     VITAMIN D3 50 mcg (2,000 unit) tablet, Take 1 tablet by mouth Daily., Disp: , Rfl:     ZOLMitriptan (ZOMIG) 5 MG tablet, Take 5 mg by mouth as needed for Migraine., Disp: , Rfl:     atorvastatin (LIPITOR) 40 MG tablet, Take 1 tablet (40 mg total) by mouth every evening., Disp: 90 tablet, Rfl: 1    empagliflozin (JARDIANCE) 10 mg tablet, Take 1 tablet (10 mg total)  by mouth once daily., Disp: 90 tablet, Rfl: 1    metFORMIN (GLUCOPHAGE-XR) 500 MG ER 24hr tablet, Take 1 tablet (500 mg total) by mouth daily with breakfast., Disp: 90 tablet, Rfl: 1    naproxen (NAPROSYN) 250 MG tablet, Take 250 mg by mouth. (Patient not taking: Reported on 3/12/2025), Disp: , Rfl:     triamcinolone acetonide 0.1% (KENALOG) 0.1 % cream, Apply topically 2 (two) times daily. (Patient not taking: Reported on 3/12/2025), Disp: 15 g, Rfl: 0

## 2025-03-12 NOTE — PROGRESS NOTES
03/12/2025   --Chart accessed for: Care Gaps  --Care Gaps addressed: eye exam  Requested records from [facility] Due   Topic Date Due    Pneumococcal Vaccines (Age 50+) (1 of 2 - PCV) Never done    Colorectal Cancer Screening  Never done    Shingles Vaccine (1 of 2) Never done    TETANUS VACCINE  12/01/2021    RSV Vaccine (Age 60+ and Pregnant patients) (1 - Risk 60-74 years 1-dose series) Never done    COVID-19 Vaccine (4 - 2024-25 season) 09/01/2024    Diabetic Eye Exam  02/05/2025    Hemoglobin A1c  03/11/2025     
Detail Level: Generalized
General Sunscreen Counseling: I recommended  SPF 30+ or sunprotective clothing.

## 2025-03-12 NOTE — Clinical Note
States he had a recent diabetic eye exam at [facility] in [place].

## 2025-03-12 NOTE — PROGRESS NOTES
Health Maintenance Due   Topic Date Due    Pneumococcal Vaccines (Age 50+) (1 of 2 - PCV) Never done    Colorectal Cancer Screening  Never done    Shingles Vaccine (1 of 2) Never done    TETANUS VACCINE  12/01/2021    RSV Vaccine (Age 60+ and Pregnant patients) (1 - Risk 60-74 years 1-dose series) Never done    COVID-19 Vaccine (4 - 2024-25 season) 09/01/2024    Diabetic Eye Exam  02/05/2025    Hemoglobin A1c  03/11/2025     Discussed care gaps.  Declined al vaccs today.  Labs ordered.  Declined colorectal cancer screening.  States he had a recent diabetic eye exam at [facility] in [place]. (Notified KEON Dickerson).    For all referral appointments outside of [facility], please allow 1 week to allow our External Referral Team to process your information and get an appointment for you. If you have not received your appointment time/date within 1 week, you can contact the External Referral Team about the process of your referral at [phone].

## 2025-03-13 ENCOUNTER — RESULTS FOLLOW-UP (OUTPATIENT)
Dept: FAMILY MEDICINE | Facility: CLINIC | Age: 60
End: 2025-03-13

## 2025-03-13 ENCOUNTER — PATIENT OUTREACH (OUTPATIENT)
Facility: HOSPITAL | Age: 60
End: 2025-03-13
Payer: OTHER GOVERNMENT

## 2025-03-13 NOTE — PROGRESS NOTES
Please let patient know that his electrolytes and kidney function are normal.  His vitamin-D level is elevated.  He will need to hold his vitamin-D for 2 weeks.  He can then restart it but should only be taking vitamin D3 1000 units daily.  His vitamin B12 level is appropriate.  His hemoglobin A1c is well-controlled at 6.3.  If the skin sensations in his leg worsen or become bothersome, he we will need to let us know so we can refer him to Neurology for possible EMG for additional evaluation.

## 2025-05-28 DIAGNOSIS — N40.1 BENIGN PROSTATIC HYPERPLASIA WITH URINARY HESITANCY: ICD-10-CM

## 2025-05-28 DIAGNOSIS — R39.11 BENIGN PROSTATIC HYPERPLASIA WITH URINARY HESITANCY: ICD-10-CM

## 2025-05-28 RX ORDER — TAMSULOSIN HYDROCHLORIDE 0.4 MG/1
0.4 CAPSULE ORAL DAILY
Qty: 90 CAPSULE | Refills: 1 | Status: SHIPPED | OUTPATIENT
Start: 2025-05-28

## 2025-05-28 RX ORDER — TAMSULOSIN HYDROCHLORIDE 0.4 MG/1
0.4 CAPSULE ORAL DAILY
COMMUNITY
End: 2025-05-28 | Stop reason: SDUPTHER

## 2025-05-28 RX ORDER — TAMSULOSIN HYDROCHLORIDE 0.4 MG/1
1 CAPSULE ORAL
Qty: 90 CAPSULE | Refills: 0 | OUTPATIENT
Start: 2025-05-28

## 2025-06-01 DIAGNOSIS — E11.65 TYPE 2 DIABETES MELLITUS WITH HYPERGLYCEMIA, WITHOUT LONG-TERM CURRENT USE OF INSULIN: ICD-10-CM
